# Patient Record
Sex: MALE | Race: WHITE | HISPANIC OR LATINO | Employment: FULL TIME | URBAN - METROPOLITAN AREA
[De-identification: names, ages, dates, MRNs, and addresses within clinical notes are randomized per-mention and may not be internally consistent; named-entity substitution may affect disease eponyms.]

---

## 2017-08-01 ENCOUNTER — APPOINTMENT (OUTPATIENT)
Dept: LAB | Facility: HOSPITAL | Age: 32
End: 2017-08-01
Payer: COMMERCIAL

## 2017-08-01 ENCOUNTER — TRANSCRIBE ORDERS (OUTPATIENT)
Dept: LAB | Facility: HOSPITAL | Age: 32
End: 2017-08-01

## 2017-08-01 DIAGNOSIS — Z00.8 HEALTH EXAMINATION IN POPULATION SURVEY: Primary | ICD-10-CM

## 2017-08-01 DIAGNOSIS — Z00.8 HEALTH EXAMINATION IN POPULATION SURVEY: ICD-10-CM

## 2017-08-01 LAB
CHOLEST SERPL-MCNC: 232 MG/DL (ref 50–200)
EST. AVERAGE GLUCOSE BLD GHB EST-MCNC: 117 MG/DL
HBA1C MFR BLD: 5.7 % (ref 4.2–6.3)
HDLC SERPL-MCNC: 50 MG/DL (ref 40–60)
LDLC SERPL CALC-MCNC: 152 MG/DL (ref 0–100)
TRIGL SERPL-MCNC: 151 MG/DL

## 2017-08-01 PROCEDURE — 80061 LIPID PANEL: CPT

## 2017-08-01 PROCEDURE — 36415 COLL VENOUS BLD VENIPUNCTURE: CPT

## 2017-08-01 PROCEDURE — 83036 HEMOGLOBIN GLYCOSYLATED A1C: CPT

## 2017-11-01 ENCOUNTER — ALLSCRIPTS OFFICE VISIT (OUTPATIENT)
Dept: OTHER | Facility: OTHER | Age: 32
End: 2017-11-01

## 2018-01-09 ENCOUNTER — ALLSCRIPTS OFFICE VISIT (OUTPATIENT)
Dept: OTHER | Facility: OTHER | Age: 33
End: 2018-01-09

## 2018-01-09 DIAGNOSIS — E78.5 HYPERLIPIDEMIA: ICD-10-CM

## 2018-01-09 DIAGNOSIS — Z72.51 HIGH RISK HETEROSEXUAL BEHAVIOR: ICD-10-CM

## 2018-01-09 DIAGNOSIS — R73.01 IMPAIRED FASTING GLUCOSE: ICD-10-CM

## 2018-01-10 NOTE — PROGRESS NOTES
Assessment   1  Asthma (493 90)   2  Heart murmur (785 2) (R01 1)   3  Pulmonic stenosis (424 3) (I37 0)   4  Impaired fasting glucose (790 21) (R73 01)   5  Unprotected sex (V69 2) (Z72 51)   6  Herpes simplex infection (054 9) (B00 9)   7  Hyperlipidemia (272 4) (E78 5)    Plan   Hyperlipidemia    · (1) LIPID PANEL, FASTING; Status:Active; Requested MJF:[id number]; Impaired fasting glucose    · (1) COMPREHENSIVE METABOLIC PANEL; Status:Active; Requested ZMX:[id number];    · (1) HEMOGLOBIN A1C; Status:Active; Requested OUM:[id number];   Pulmonic stenosis    · 1 - Ruma STOUT, Allen Morris (Cardiology) Co-Management  *  Status: Active - Retrospective By    Protocol Authorization  Requested for: [id number]  Care Summary provided  : Yes  SocHx: Unprotected sex    · (1) CHLAMYDIA/GC AMPLIFIED DNA, PCR; Source:Urine, Unspecified Source;    Status:Active; Requested EN:[id number];    · (1) CHRONIC HEPATITIS PANEL; Status:Active; Requested HWC:[id number];    · (1) HIV AG/AB COMBO, 4TH GEN; [Do Not Release]; Status:Active; Requested    FBA:[id number];    · (1) RPR; Status:Active; Requested HIN:[id number];    · (LC) N  gonorrhoeae DENNY, Confirm; Status:Active; Requested WAM:[id number]; Discussion/Summary   Discussion Summary:    Asthma- stable with current inhalers advise to continue, he will find out the name of his daily nigrae and call with the name and dose of medications  stenosis, S/P angioplasty- referred to cardiologist Dr Nataliai Finley to establish care  fasting glucose/Hyperlipidemia- he was counseled in regards to diet and exercise and given handout for both will re-check levels at this time  sex- will check for STD at this time  up in 3 months  Medication SE Review and Pt Understands Tx: Possible side effects of new medications were reviewed with the patient/guardian today  The treatment plan was reviewed with the patient/guardian   The patient/guardian understands and agrees with the treatment plan      Chief Complaint   Chief Complaint Free Text Note Form: Patient seen in office today for a new patient exam to establish care - patient has a history of Asthma, Pulmonic Stenosis which patient's mother stated that he will be needing a referral for a cardiologist       History of Present Illness   HPI: Patient is here to establish care- used to see Dr Mirlande Zepeda   has a history Astham and takes Ventolin as needed a daily inhaler which he thinks Symbicort or Advair he does not remember  is not a smoker  has a History of Pulmonary stenosis and had angioplasty of pulmonary as a infant  is working [employer]  wants to get tested for sexually transmitted disease has history of herpes genital and also unprotect sex in the last 3 months  He says that he uses protection when he has a Herpes Outbreak  had blood work done in August 2017 which showed borderline glucose 5 7 and also also elevated cholesterol  he denies any symptoms at this such as polyuria or polydipsia however  Review of Systems   Complete-Male:      Constitutional: No fever or chills, feels well, no tiredness, no recent weight gain or weight loss  Eyes: No complaints of eye pain, no red eyes, no discharge from eyes, no itchy eyes  Cardiovascular: No complaints of slow heart rate, no fast heart rate, no chest pain, no palpitations, no leg claudication, no lower extremity  Respiratory: No complaints of shortness of breath, no wheezing, no cough, no SOB on exertion, no orthopnea or PND  Gastrointestinal: No complaints of abdominal pain, no constipation, no nausea or vomiting, no diarrhea or bloody stools  Musculoskeletal: No complaints of arthralgia, no myalgias, no joint swelling or stiffness, no limb pain or swelling  Integumentary: No complaints of skin rash or skin lesions, no itching, no skin wound, no dry skin        Neurological: No compliants of headache, no confusion, no convulsions, no numbness or tingling, no dizziness or fainting, no limb weakness, no difficulty walking  Endocrine: No complaints of proptosis, no hot flashes, no muscle weakness, no erectile dysfunction, no deepening of the voice, no feelings of weakness  Hematologic/Lymphatic: No complaints of swollen glands, no swollen glands in the neck, does not bleed easily, no easy bruising  ROS Reviewed:    ROS reviewed  Active Problems   1  Asthma (493 90)   2  Closed nondisplaced fracture of fifth metatarsal bone of right foot, initial encounter     (825 25) (S92 354A)   3  Heart disorder (429 9)   4  Heart murmur (785 2) (R01 1)   5  Pulmonic stenosis (424 3) (I37 0)    Past Medical History   1  History of herpes genitalis (V12 09) (Z86 19)  Active Problems And Past Medical History Reviewed: The active problems and past medical history were reviewed and updated today  Surgical History   1  History of Knee Surgery    Family History   Father    1  Family history of hypercholesterolemia (V18 19) (Z83 42)   2  Family history of hypertension (V17 49) (Z82 49)    Social History    · Unprotected sex (V69 2) (Z72 51)    Current Meds    1  Ventolin  (90 Base) MCG/ACT Inhalation Aerosol Solution Recorded    Allergies   1  No Known Drug Allergies    Vitals   Vital Signs    Recorded: 93ECZ5977 09:35AM   Temperature 97 2 F   Heart Rate 85   Systolic 519   Diastolic 82   Height 6 ft 2 in   Weight 270 lb    BMI Calculated 34 67   BSA Calculated 2 47   O2 Saturation 98     Physical Exam        Constitutional      General appearance: No acute distress, well appearing and well nourished  Eyes      Conjunctiva and lids: No swelling, erythema, or discharge  -- EOMI  Pulmonary      Respiratory effort: No increased work of breathing or signs of respiratory distress  Auscultation of lungs: Clear to auscultation, equal breath sounds bilaterally, no wheezes, no rales, no rhonci         Cardiovascular      Auscultation of heart: Normal rate and rhythm, normal S1 and S2, without murmurs  Musculoskeletal      Gait and station: Normal        Skin      Skin and subcutaneous tissue: Normal without rashes or lesions         Psychiatric      Orientation to person, place and time: Normal        Mood and affect: Normal           Signatures    Electronically signed by : Jonas Huerta MD; Jan 9 2018 10:18AM EST                       (Author)

## 2018-01-12 NOTE — MISCELLANEOUS
Provider Comments  Provider Comments:   new pt no show 11/01/2017      Signatures   Electronically signed by : Yanni Goins MD; Nov 2 2017  7:13AM EST                       (Author)

## 2018-01-22 ENCOUNTER — LAB (OUTPATIENT)
Dept: LAB | Facility: HOSPITAL | Age: 33
End: 2018-01-22
Payer: COMMERCIAL

## 2018-01-22 ENCOUNTER — TRANSCRIBE ORDERS (OUTPATIENT)
Dept: LAB | Facility: HOSPITAL | Age: 33
End: 2018-01-22

## 2018-01-22 VITALS
WEIGHT: 270 LBS | HEIGHT: 74 IN | SYSTOLIC BLOOD PRESSURE: 130 MMHG | BODY MASS INDEX: 34.65 KG/M2 | HEART RATE: 85 BPM | OXYGEN SATURATION: 98 % | TEMPERATURE: 97.2 F | DIASTOLIC BLOOD PRESSURE: 82 MMHG

## 2018-01-22 DIAGNOSIS — R73.01 IMPAIRED FASTING GLUCOSE: ICD-10-CM

## 2018-01-22 DIAGNOSIS — Z72.51 HIGH RISK HETEROSEXUAL BEHAVIOR: ICD-10-CM

## 2018-01-22 DIAGNOSIS — E78.5 HYPERLIPIDEMIA: ICD-10-CM

## 2018-01-22 LAB
ALBUMIN SERPL BCP-MCNC: 3.8 G/DL (ref 3.5–5)
ALP SERPL-CCNC: 64 U/L (ref 46–116)
ALT SERPL W P-5'-P-CCNC: 33 U/L (ref 12–78)
ANION GAP SERPL CALCULATED.3IONS-SCNC: 5 MMOL/L (ref 4–13)
AST SERPL W P-5'-P-CCNC: 14 U/L (ref 5–45)
BILIRUB SERPL-MCNC: 0.33 MG/DL (ref 0.2–1)
BUN SERPL-MCNC: 12 MG/DL (ref 5–25)
CALCIUM SERPL-MCNC: 9 MG/DL (ref 8.3–10.1)
CHLORIDE SERPL-SCNC: 108 MMOL/L (ref 100–108)
CHOLEST SERPL-MCNC: 194 MG/DL (ref 50–200)
CO2 SERPL-SCNC: 28 MMOL/L (ref 21–32)
CREAT SERPL-MCNC: 0.95 MG/DL (ref 0.6–1.3)
EST. AVERAGE GLUCOSE BLD GHB EST-MCNC: 114 MG/DL
GFR SERPL CREATININE-BSD FRML MDRD: 105 ML/MIN/1.73SQ M
GLUCOSE P FAST SERPL-MCNC: 84 MG/DL (ref 65–99)
HBA1C MFR BLD: 5.6 % (ref 4.2–6.3)
HBV CORE AB SER QL: NORMAL
HBV CORE IGM SER QL: NORMAL
HBV SURFACE AG SER QL: NORMAL
HCV AB SER QL: NORMAL
HDLC SERPL-MCNC: 51 MG/DL (ref 40–60)
LDLC SERPL CALC-MCNC: 132 MG/DL (ref 0–100)
POTASSIUM SERPL-SCNC: 4.1 MMOL/L (ref 3.5–5.3)
PROT SERPL-MCNC: 7.2 G/DL (ref 6.4–8.2)
RPR SER QL: NORMAL
SODIUM SERPL-SCNC: 141 MMOL/L (ref 136–145)
TRIGL SERPL-MCNC: 55 MG/DL

## 2018-01-22 PROCEDURE — 80061 LIPID PANEL: CPT

## 2018-01-22 PROCEDURE — 36415 COLL VENOUS BLD VENIPUNCTURE: CPT

## 2018-01-22 PROCEDURE — 83036 HEMOGLOBIN GLYCOSYLATED A1C: CPT

## 2018-01-22 PROCEDURE — 86592 SYPHILIS TEST NON-TREP QUAL: CPT

## 2018-01-22 PROCEDURE — 86705 HEP B CORE ANTIBODY IGM: CPT

## 2018-01-22 PROCEDURE — 87389 HIV-1 AG W/HIV-1&-2 AB AG IA: CPT

## 2018-01-22 PROCEDURE — 80053 COMPREHEN METABOLIC PANEL: CPT

## 2018-01-22 PROCEDURE — 87340 HEPATITIS B SURFACE AG IA: CPT

## 2018-01-22 PROCEDURE — 86803 HEPATITIS C AB TEST: CPT

## 2018-01-22 PROCEDURE — 86704 HEP B CORE ANTIBODY TOTAL: CPT

## 2018-01-24 ENCOUNTER — TELEPHONE (OUTPATIENT)
Dept: FAMILY MEDICINE CLINIC | Facility: CLINIC | Age: 33
End: 2018-01-24

## 2018-01-24 LAB — HIV 1+2 AB+HIV1 P24 AG SERPL QL IA: NORMAL

## 2018-01-24 NOTE — TELEPHONE ENCOUNTER
Na on cell home number no ability to leave message----- Message from Yovany Sims MD sent at 1/24/2018  1:39 PM EST -----  Patient HIV test negative advise pt thanks

## 2018-01-25 ENCOUNTER — TELEPHONE (OUTPATIENT)
Dept: FAMILY MEDICINE CLINIC | Facility: CLINIC | Age: 33
End: 2018-01-25

## 2018-01-26 DIAGNOSIS — J45.909 ASTHMA, UNSPECIFIED ASTHMA SEVERITY, UNSPECIFIED WHETHER COMPLICATED, UNSPECIFIED WHETHER PERSISTENT: Primary | ICD-10-CM

## 2018-01-26 NOTE — TELEPHONE ENCOUNTER
Would like meds refilled    Also would like scripts for nebulizer machine along with mask and tubing sent to [facility] in [facility]

## 2018-01-29 ENCOUNTER — TELEPHONE (OUTPATIENT)
Dept: FAMILY MEDICINE CLINIC | Facility: CLINIC | Age: 33
End: 2018-01-29

## 2018-01-29 NOTE — TELEPHONE ENCOUNTER
Pt notified        ----- Message from Candace Jerome MD sent at 1/24/2018  4:34 PM EST -----      ----- Message -----  From: Candace Jerome MD  Sent: 1/24/2018   1:39 PM  To: Porfirio  Clinical    Patient HIV test negative advise pt thanks

## 2018-01-30 ENCOUNTER — TELEPHONE (OUTPATIENT)
Dept: FAMILY MEDICINE CLINIC | Facility: CLINIC | Age: 33
End: 2018-01-30

## 2018-01-30 NOTE — TELEPHONE ENCOUNTER
Patients' mother  called to see if he can get a refill on famciclovir 500 mg?  I don't see anything in his medication list

## 2018-01-31 DIAGNOSIS — B00.9 HERPES: Primary | ICD-10-CM

## 2018-01-31 RX ORDER — FAMCICLOVIR 500 MG/1
500 TABLET, FILM COATED ORAL 3 TIMES DAILY
Qty: 21 TABLET | Refills: 0 | Status: SHIPPED | OUTPATIENT
Start: 2018-01-31 | End: 2021-10-29

## 2018-02-26 ENCOUNTER — HOSPITAL ENCOUNTER (EMERGENCY)
Facility: HOSPITAL | Age: 33
Discharge: HOME | End: 2018-02-26
Attending: EMERGENCY MEDICINE | Admitting: EMERGENCY MEDICINE

## 2018-02-26 ENCOUNTER — APPOINTMENT (EMERGENCY)
Dept: RADIOLOGY | Facility: HOSPITAL | Age: 33
End: 2018-02-26

## 2018-02-26 VITALS
DIASTOLIC BLOOD PRESSURE: 83 MMHG | TEMPERATURE: 98.8 F | RESPIRATION RATE: 18 BRPM | HEART RATE: 95 BPM | OXYGEN SATURATION: 98 % | WEIGHT: 255 LBS | BODY MASS INDEX: 32.73 KG/M2 | HEIGHT: 74 IN | SYSTOLIC BLOOD PRESSURE: 159 MMHG

## 2018-02-26 DIAGNOSIS — S93.602A FOOT SPRAIN, LEFT, INITIAL ENCOUNTER: Primary | ICD-10-CM

## 2018-02-26 PROCEDURE — 99283 EMERGENCY DEPT VISIT LOW MDM: CPT

## 2018-02-26 PROCEDURE — 73630 X-RAY EXAM OF FOOT: CPT

## 2018-02-26 RX ORDER — ACETAMINOPHEN 325 MG/1
650 TABLET ORAL ONCE
Status: COMPLETED | OUTPATIENT
Start: 2018-02-26 | End: 2018-02-26

## 2018-02-26 RX ADMIN — ACETAMINOPHEN 650 MG: 325 TABLET, FILM COATED ORAL at 16:29

## 2018-02-26 NOTE — ED PROVIDER NOTES
History  Chief Complaint   Patient presents with    Ankle Injury     injured left foot this AM, states foot got caught between wall and machine      HPI    Prior to Admission Medications   Prescriptions Last Dose Informant Patient Reported? Taking? Mometasone Furo-Formoterol Fum (DULERA) 200-5 MCG/ACT AERO   No No   Sig: Inhale 1 puff 2 (two) times a day   Nebulizers (NEBULIZER COMPRESSOR) MISC   No No   Sig: by Does not apply route 2 (two) times a day as needed (sob)   Respiratory Therapy Supplies (NEBULIZER/ADULT MASK) KIT   No No   Sig: by Does not apply route 2 (two) times a day as needed (sob)   famciclovir (FAMVIR) 500 mg tablet   No No   Sig: Take 1 tablet (500 mg total) by mouth 3 (three) times a day for 7 days      Facility-Administered Medications: None       Past Medical History:   Diagnosis Date    Asthma     Heart murmur     Pulmonic stenosis        Past Surgical History:   Procedure Laterality Date    ANGIOPLASTY      KNEE SURGERY         History reviewed  No pertinent family history  I have reviewed and agree with the history as documented  Social History   Substance Use Topics    Smoking status: Never Smoker    Smokeless tobacco: Never Used    Alcohol use No        Review of Systems    Physical Exam  ED Triage Vitals [02/26/18 1524]   Temperature Pulse Respirations Blood Pressure SpO2   98 8 °F (37 1 °C) 95 18 159/83 98 %      Temp Source Heart Rate Source Patient Position - Orthostatic VS BP Location FiO2 (%)   Oral Monitor Sitting Left arm --      Pain Score       8           Orthostatic Vital Signs  Vitals:    02/26/18 1524   BP: 159/83   Pulse: 95   Patient Position - Orthostatic VS: Sitting       Physical Exam   Constitutional: He is oriented to person, place, and time  He appears well-developed and well-nourished  No distress  HENT:   Head: Normocephalic and atraumatic  Eyes: Conjunctivae are normal  Pupils are equal, round, and reactive to light     Neck: Normal range of motion  No tracheal deviation present  Cardiovascular: Normal rate, regular rhythm and intact distal pulses  Pulses:       Dorsalis pedis pulses are 2+ on the left side  Pulmonary/Chest: Effort normal  No respiratory distress  Musculoskeletal:        Left foot: There is tenderness and bony tenderness  There is normal range of motion, no swelling, normal capillary refill, no crepitus and no deformity  Feet:    Neurological: He is alert and oriented to person, place, and time  GCS eye subscore is 4  GCS verbal subscore is 5  GCS motor subscore is 6  Skin: Skin is warm and dry  Psychiatric: He has a normal mood and affect  His behavior is normal    Nursing note and vitals reviewed  ED Medications  Medications   acetaminophen (TYLENOL) tablet 650 mg (650 mg Oral Given 2/26/18 1629)       Diagnostic Studies  Results Reviewed     None                 XR foot 3+ views LEFT    (Results Pending)              Procedures  Procedures       Phone Contacts  ED Phone Contact    ED Course  ED Course                                MDM  Number of Diagnoses or Management Options  Foot sprain, left, initial encounter: new and requires workup  Diagnosis management comments: This is a 70-year-old male who presents here today with a left foot injury  At about 0615 this morning he was driving a piece of equipment work, and his toes were sticking out of it as he was turning a corner by a wall  His toes caught the wall and his foot twisted  He took two ibuprofen when he got home from work at about 10 this morning, and was still having significant pain when he woke from a nap, prompting him to come to the ER for evaluation  He endorses some worsening pain with walking, but is able to do so without difficulties  He has not taken or done anything else for the pain  He has broken several toes before, but is unsure of which foot  He denies any other injuries from this event      ROS: Otherwise negative, unless stated as above  He is well-appearing, in no acute distress  He does have some tenderness to the forefoot, without obvious signs of trauma  Is neurovascularly intact distally  We will get an x-ray to evaluate for underlying bony injury  The x-ray was reviewed by myself and the radiologist, and shows no acute bony  I discussed with the patient findings, treatment at home, follow-up, and indications for return, and he expresses understanding with this plan  Amount and/or Complexity of Data Reviewed  Tests in the radiology section of CPT®: reviewed and ordered  Independent visualization of images, tracings, or specimens: yes      CritCare Time    Disposition  Final diagnoses: Foot sprain, left, initial encounter     Time reflects when diagnosis was documented in both MDM as applicable and the Disposition within this note     Time User Action Codes Description Comment    2/26/2018  4:33 PM Mary Romero Add [[id number]] Foot sprain, left, initial encounter       ED Disposition     ED Disposition Condition Comment    Discharge  Dayana Albert discharge to home/self care  Condition at discharge: Good        Follow-up Information     Follow up With Specialties Details Why [address] Chen MD Family Medicine Schedule an appointment as soon as possible for a visit in 1 week As needed to follow up on your foot [address] [facility]  [address]  [place] [address]  [phone]          Patient's Medications   Discharge Prescriptions    No medications on file     No discharge procedures on file      ED Provider  Electronically Signed by           Jose Arceo MD  02/26/18 1759

## 2018-02-26 NOTE — DISCHARGE INSTRUCTIONS
Wear the Ace wrap, and firm soled shoes when you are walking around  Keep the foot elevated, and apply ice  Take ibuprofen (Motrin, Advil) or acetaminophen (Tylenol) as needed for pain, as per the instructions  Follow up with the primary care doctor to make sure you are doing better  Foot Sprain   WHAT YOU NEED TO KNOW:   A foot sprain is caused by a stretched or torn ligament in the foot or toe  Ligaments are tough tissues that connect bones  DISCHARGE INSTRUCTIONS:   Seek care immediately if:   · You have numbness or tingling below the injury, such as in your toes  · The skin on your injured foot is blue or pale  · You have increased pain, even after you take pain medicine  Contact your healthcare provider if:   · You have new weakness in your foot  · You have new or increased swelling in your foot  · You have new or increased stiffness when you move your injured foot  · You have questions or concerns about your condition or care  Medicines:   · NSAIDs , such as ibuprofen, help decrease swelling, pain, and fever  This medicine is available with or without a doctor's order  NSAIDs can cause stomach bleeding or kidney problems in certain people  If you take blood thinner medicine, always ask if NSAIDs are safe for you  Always read the medicine label and follow directions  Do not give these medicines to children under 10months of age without direction from your child's healthcare provider  · Take your medicine as directed  Contact your healthcare provider if you think your medicine is not helping or if you have side effects  Tell him of her if you are allergic to any medicine  Keep a list of the medicines, vitamins, and herbs you take  Include the amounts, and when and why you take them  Bring the list or the pill bottles to follow-up visits  Carry your medicine list with you in case of an emergency  Self-care:   · Rest your foot    Limit movement in your sprained foot for the first 2 to 3 days  You might need crutches to take weight off your injured foot as it heals  Use crutches as directed  · Apply ice  on your foot for 15 to 20 minutes every hour or as directed  Use an ice pack, or put crushed ice in a plastic bag  Cover it with a towel  Ice helps prevent tissue damage and decreases swelling and pain  · Compress your foot  You may need to use tape or an elastic bandage to support your foot if you have a mild sprain  You may need a splint on your foot for support if your sprain is severe  Wear your splint for as many days as directed  · Elevate your foot  above the level of your heart as often as you can  This will help decrease swelling and pain  Prop your foot on pillows or blankets to keep it elevated comfortably  Exercise your foot:  You may be given exercises to improve your strength and to help decrease stiffness  The exercises and physical therapy can help restore strength and increase the range of motion in your foot  Ask your healthcare provider when you can return to your normal activities or play sports  Prevent another foot sprain:   · Warm up and stretch before you exercise  · Do not exercise when you feel pain or are tired  · Wear equipment to protect yourself when you play sports  Follow up with your healthcare provider as directed:  Write down your questions so you remember to ask them during your visits  © 2017 [employer] Information is for End User's use only and may not be sold, redistributed or otherwise used for commercial purposes  All illustrations and images included in CareNotes® are the copyrighted property of [employer]  or [facility]  The above information is an  only  It is not intended as medical advice for individual conditions or treatments  Talk to your doctor, nurse or pharmacist before following any medical regimen to see if it is safe and effective for you

## 2018-02-27 DIAGNOSIS — J45.909 ASTHMA, UNSPECIFIED ASTHMA SEVERITY, UNSPECIFIED WHETHER COMPLICATED, UNSPECIFIED WHETHER PERSISTENT: Primary | ICD-10-CM

## 2018-04-05 ENCOUNTER — OFFICE VISIT (OUTPATIENT)
Dept: DERMATOLOGY | Facility: CLINIC | Age: 33
End: 2018-04-05
Payer: COMMERCIAL

## 2018-04-05 DIAGNOSIS — L20.84 INTRINSIC ATOPIC DERMATITIS: Primary | ICD-10-CM

## 2018-04-05 DIAGNOSIS — Z13.89 SCREENING FOR SKIN CONDITION: ICD-10-CM

## 2018-04-05 DIAGNOSIS — L72.9 FOLLICULAR CYST OF SKIN: ICD-10-CM

## 2018-04-05 PROCEDURE — 99203 OFFICE O/P NEW LOW 30 MIN: CPT | Performed by: DERMATOLOGY

## 2018-04-05 RX ORDER — BETAMETHASONE DIPROPIONATE 0.5 MG/G
OINTMENT TOPICAL 2 TIMES DAILY
Qty: 15 G | Refills: 1 | Status: SHIPPED | OUTPATIENT
Start: 2018-04-05 | End: 2018-12-06 | Stop reason: SDUPTHER

## 2018-04-05 RX ORDER — DOXYCYCLINE HYCLATE 100 MG/1
100 CAPSULE ORAL EVERY 12 HOURS SCHEDULED
Qty: 28 CAPSULE | Refills: 0 | Status: SHIPPED | OUTPATIENT
Start: 2018-04-05 | End: 2018-04-19

## 2018-04-05 NOTE — PATIENT INSTRUCTIONS
hand dermatitis will probably atopic related will go ahead and treat with topical steroids to see what happens hopefully get this under control need to keep his hands out of water as much as possible where use moisturizes and use mild soap like Dove   follicular cyst appears inflamed we use warm compresses and antibiotics and will see if this resolves if not we will need to consider excision will recheck in a few weeks  Screening for Dermatologic Disorders: Nothing else of concern noted on complete exam follow up prn

## 2018-04-05 NOTE — PROGRESS NOTES
[address] [facility] [address] [facility] DERMATOLOGY  [address] [place] [address]     MRN: [id number] : 1985  Encounter: [id number]  Patient Information: Cleophus Plume  Chief complaint:Inflamed cyst in right axilla and rash on the left hand    History of present illness:  79-year-old male with previous history of atopy and history of eczema presents for concerns regarding itchy rash on his left hand for about 6 months  Patient is time and tried some topical creams use triamcinolone and over-the-counter preparations without improvement patient also notes a sudden onset of inflamed cyst in the right axilla was not previously present as far as he was aware  Past Medical History:   Diagnosis Date    Asthma     Heart murmur     Pulmonic stenosis      Past Surgical History:   Procedure Laterality Date    ANGIOPLASTY      KNEE SURGERY       Social History   History   Alcohol Use No     History   Drug Use No     History   Smoking Status    Never Smoker   Smokeless Tobacco    Never Used     No family history on file  Meds/Allergies   No Known Allergies    Meds:  Prior to Admission medications    Medication Sig Start Date End Date Taking?  Authorizing Provider   Mometasone Furo-Formoterol Fum (DULERA) 200-5 MCG/ACT AERO Inhale 1 puff 2 (two) times a day 18  Yes Mona Mosqueda MD   Nebulizers (NEBULIZER COMPRESSOR) MISC by Does not apply route 2 (two) times a day as needed (sob) 18  Yes Mona Mosqueda MD   Respiratory Therapy Supplies (NEBULIZER/ADULT MASK) KIT by Does not apply route 2 (two) times a day as needed (sob) 18  Yes Mona Mosqueda MD   VENTOLIN  (38 Base) MCG/ACT inhaler inhale 2 puffs by mouth four times a day 18  Yes Mona Mosqueda MD   famciclovir RIVER [facility]) 500 mg tablet Take 1 tablet (500 mg total) by mouth 3 (three) times a day for 7 days 18  Mona Mosqueda MD       Subjective:     Review of Systems:    General: negative for - chills, fatigue, fever,  weight gain or weight loss  Psychological: negative for - anxiety, behavioral disorder, concentration difficulties, decreased libido, depression, irritability, memory difficulties, mood swings, sleep disturbances or suicidal ideation  ENT: negative for - hearing difficulties , nasal congestion, nasal discharge, oral lesions, sinus pain, sneezing, sore throat  Allergy and Immunology: negative for - hives, insect bite sensitivity,  Hematological and Lymphatic: negative for - bleeding problems, blood clots,bruising, swollen lymph nodes  Endocrine: negative for - hair pattern changes, hot flashes, malaise/lethargy, mood swings, palpitations, polydipsia/polyuria, skin changes, temperature intolerance or unexpected weight change  Respiratory: negative for - cough, hemoptysis, orthopnea, shortness of breath, or wheezing  Cardiovascular: negative for - chest pain, dyspnea on exertion, edema,  Gastrointestinal: negative for - abdominal pain, nausea/vomiting  Genito-Urinary: negative for - dysuria, incontinence, irregular/heavy menses or urinary frequency/urgency  Musculoskeletal: negative for - gait disturbance, joint pain, joint stiffness, joint swelling, muscle pain, muscular weakness  Dermatological:  As in HPI  Neurological: negative for confusion, dizziness, headaches, impaired coordination/balance, memory loss, numbness/tingling, seizures, speech problems, tremors or weakness       Objective: There were no vitals taken for this visit      Physical Exam:    General Appearance:    Alert, cooperative, no distress   Head:    Normocephalic, without obvious abnormality, atraumatic           Skin:   A full skin exam was performed including scalp, head scalp, eyes, ears, nose, lips, neck, chest, axilla, abdomen, back, buttocks, bilateral upper extremities, bilateral lower extremities, hands, feet, fingers, toes, fingernails, and toenails   vesicles scaling noted on the left palm no other evidence of eczema anywhere else cystic inflamed a nodule noted in the right axilla     Assessment:     1  Intrinsic atopic dermatitis     2  Follicular cyst of skin     3  Screening for skin condition           Plan:    hand dermatitis will probably atopic related will go ahead and treat with topical steroids to see what happens hopefully get this under control need to keep his hands out of water as much as possible where use moisturizes and use mild soap like Dove   follicular cyst appears inflamed we use warm compresses and antibiotics and will see if this resolves if not we will need to consider excision will recheck in a few weeks  Screening for Dermatologic Disorders: Nothing else of concern noted on complete exam follow up in 1 year     Phill Gardiner MD  4/5/2018,11:36 AM    Portions of the record may have been created with voice recognition software   Occasional wrong word or "sound a like" substitutions may have occurred due to the inherent limitations of voice recognition software   Read the chart carefully and recognize, using context, where substitutions have occurred

## 2018-04-26 ENCOUNTER — OFFICE VISIT (OUTPATIENT)
Dept: DERMATOLOGY | Facility: CLINIC | Age: 33
End: 2018-04-26
Payer: COMMERCIAL

## 2018-04-26 DIAGNOSIS — L72.9 FOLLICULAR CYST OF SKIN: Primary | ICD-10-CM

## 2018-04-26 PROCEDURE — 99212 OFFICE O/P EST SF 10 MIN: CPT | Performed by: DERMATOLOGY

## 2018-04-26 RX ORDER — NAPROXEN 250 MG/1
TABLET ORAL
COMMUNITY
End: 2018-12-21 | Stop reason: ALTCHOICE

## 2018-04-26 NOTE — PATIENT INSTRUCTIONS
Follicular cyst advised patient that this is from hair follicles that ballooned out and forms this type of growth  No treatment indicated unless patient so desires or if lesion enlarges or changes   30 min surgery will be required

## 2018-04-26 NOTE — PROGRESS NOTES
[address] [facility]  [address] [address]     MRN: [id number] : 1985  Encounter: [id number]  Patient Information: Chris Godoy    Subjective:     76-year-old male presents for follow-up for his inflamed cyst right axilla patient notes improvement     Objective: There were no vitals taken for this visit  Physical Exam:    General Appearance:    Alert, cooperative, no distress   Skin:    Cyst still noted on the right axilla without inflammation much smaller than previous     Assessment:     1  Follicular cyst of skin           Plan: Follicular cyst advised patient that this is from hair follicles that ballooned out and forms this type of growth  No treatment indicated unless patient so desires or if lesion enlarges or changes  30 min surgery will be required      Prior to Admission medications    Medication Sig Start Date End Date Taking?  Authorizing Provider   betamethasone, augmented, (DIPROLENE) 0 05 % ointment Apply topically 2 (two) times a day Applied to left hand twice a day  Till  rash clears 18  Yes Steffany Parker MD   fluticasone-salmeterol (ADVAIR DISKUS) 250-50 mcg/dose inhaler Inhale   Yes Historical Provider, MD   Mometasone Furo-Formoterol Fum (DULERA) 200-5 MCG/ACT AERO Inhale 1 puff 2 (two) times a day 18  Yes Kip Avalos MD   Nebulizers (NEBULIZER COMPRESSOR) MISC by Does not apply route 2 (two) times a day as needed (sob) 18  Yes Kip Avalos MD   Respiratory Therapy Supplies (NEBULIZER/ADULT MASK) KIT by Does not apply route 2 (two) times a day as needed (sob) 18  Yes Kip Avalos MD   VENTOLIN  (49 Base) MCG/ACT inhaler inhale 2 puffs by mouth four times a day 18  Yes Kip Avalos MD   famciclovir RIVER [facility]) 500 mg tablet Take 1 tablet (500 mg total) by mouth 3 (three) times a day for 7 days 18  Kip Avalos MD   naproxen (NAPROSYN) 250 mg tablet Take by mouth Historical Provider, MD     No Known Allergies    Luisa Gongora MD  4/26/2018,10:36 AM    Portions of the record may have been created with voice recognition software   Occasional wrong word or "sound a like" substitutions may have occurred due to the inherent limitations of voice recognition software   Read the chart carefully and recognize, using context, where substitutions have occurred

## 2018-08-17 ENCOUNTER — TRANSCRIBE ORDERS (OUTPATIENT)
Dept: LAB | Facility: HOSPITAL | Age: 33
End: 2018-08-17

## 2018-08-17 ENCOUNTER — APPOINTMENT (OUTPATIENT)
Dept: LAB | Facility: HOSPITAL | Age: 33
End: 2018-08-17

## 2018-08-17 DIAGNOSIS — Z00.8 HEALTH EXAMINATION IN POPULATION SURVEY: ICD-10-CM

## 2018-08-17 DIAGNOSIS — Z00.8 HEALTH EXAMINATION IN POPULATION SURVEY: Primary | ICD-10-CM

## 2018-08-17 LAB
CHOLEST SERPL-MCNC: 227 MG/DL (ref 50–200)
EST. AVERAGE GLUCOSE BLD GHB EST-MCNC: 108 MG/DL
HBA1C MFR BLD: 5.4 % (ref 4.2–6.3)
HDLC SERPL-MCNC: 47 MG/DL (ref 40–60)
LDLC SERPL CALC-MCNC: 162 MG/DL (ref 0–100)
NONHDLC SERPL-MCNC: 180 MG/DL
TRIGL SERPL-MCNC: 91 MG/DL

## 2018-08-17 PROCEDURE — 36415 COLL VENOUS BLD VENIPUNCTURE: CPT

## 2018-08-17 PROCEDURE — 80061 LIPID PANEL: CPT

## 2018-08-17 PROCEDURE — 83036 HEMOGLOBIN GLYCOSYLATED A1C: CPT

## 2018-12-06 DIAGNOSIS — L20.84 INTRINSIC ATOPIC DERMATITIS: ICD-10-CM

## 2018-12-06 RX ORDER — BETAMETHASONE DIPROPIONATE 0.5 MG/G
OINTMENT TOPICAL 2 TIMES DAILY
Qty: 15 G | Refills: 0 | Status: SHIPPED | OUTPATIENT
Start: 2018-12-06 | End: 2019-08-28 | Stop reason: ALTCHOICE

## 2018-12-21 ENCOUNTER — OFFICE VISIT (OUTPATIENT)
Dept: FAMILY MEDICINE CLINIC | Facility: CLINIC | Age: 33
End: 2018-12-21
Payer: COMMERCIAL

## 2018-12-21 VITALS
OXYGEN SATURATION: 97 % | BODY MASS INDEX: 34.27 KG/M2 | SYSTOLIC BLOOD PRESSURE: 138 MMHG | RESPIRATION RATE: 18 BRPM | WEIGHT: 267 LBS | DIASTOLIC BLOOD PRESSURE: 86 MMHG | HEIGHT: 74 IN | HEART RATE: 90 BPM | TEMPERATURE: 98.9 F

## 2018-12-21 DIAGNOSIS — L72.9 INFECTED CYST OF SKIN: Primary | ICD-10-CM

## 2018-12-21 DIAGNOSIS — L08.9 INFECTED CYST OF SKIN: Primary | ICD-10-CM

## 2018-12-21 PROCEDURE — 3008F BODY MASS INDEX DOCD: CPT | Performed by: FAMILY MEDICINE

## 2018-12-21 PROCEDURE — 99213 OFFICE O/P EST LOW 20 MIN: CPT | Performed by: FAMILY MEDICINE

## 2018-12-21 PROCEDURE — 1036F TOBACCO NON-USER: CPT | Performed by: FAMILY MEDICINE

## 2018-12-21 RX ORDER — NAPROXEN 500 MG/1
500 TABLET ORAL 2 TIMES DAILY WITH MEALS
Qty: 30 TABLET | Refills: 0 | Status: SHIPPED | OUTPATIENT
Start: 2018-12-21 | End: 2019-08-28 | Stop reason: ALTCHOICE

## 2018-12-21 RX ORDER — SULFAMETHOXAZOLE AND TRIMETHOPRIM 800; 160 MG/1; MG/1
1 TABLET ORAL EVERY 12 HOURS SCHEDULED
Qty: 14 TABLET | Refills: 0 | Status: SHIPPED | OUTPATIENT
Start: 2018-12-21 | End: 2018-12-28

## 2018-12-21 NOTE — PROGRESS NOTES
Rhona Gastelum 1985 male MRN: [id number]    Acute Visit        ASSESSMENT/PLAN  Diagnoses and all orders for this visit:    Infected cyst of skin  -     sulfamethoxazole-trimethoprim (BACTRIM DS) 800-160 mg per tablet; Take 1 tablet by mouth every 12 (twelve) hours for 7 days  -     naproxen (NAPROSYN) 500 mg tablet; Take 1 tablet (500 mg total) by mouth 2 (two) times a day with meals for 15 days          Take abx as directed  Use naproxen for pain/swelling  Fu 1 wk if not better  Future Appointments  Date Time Provider Jayem Booker   12/27/2018 3:20 PM MD IZZY Goldsmith  [facility]        SUBJECTIVE  CC: Cyst (pt has c/o a cyst under his armpits )       He has infected cysts under both arms  Had similar problem about a year ago  No current treatment  No fevers  Rhona Gastelum is a 28 y o  male who presented for an acute visit complaining of  Review of Systems   Constitutional: Negative for chills and fever     Skin:        Redness and tenderness, swelling under b/l arms R> L       Historical Information   The patient history was reviewed as follows:  Past Medical History:   Diagnosis Date    Asthma     Heart murmur     Pulmonic stenosis      Past Surgical History:   Procedure Laterality Date    ANGIOPLASTY      KNEE SURGERY       Family History   Problem Relation Age of Onset    Hyperlipidemia Father     Hypertension Father       Social History   History   Alcohol Use No     History   Drug Use No     History   Smoking Status    Never Smoker   Smokeless Tobacco    Never Used       Medications:   Meds/Allergies   Current Outpatient Prescriptions   Medication Sig Dispense Refill    Nebulizers (NEBULIZER COMPRESSOR) MISC by Does not apply route 2 (two) times a day as needed (sob) 1 each 0    Respiratory Therapy Supplies (NEBULIZER/ADULT MASK) KIT by Does not apply route 2 (two) times a day as needed (sob) 8 each 0    VENTOLIN  (90 Base) MCG/ACT inhaler inhale 2 puffs by mouth four times a day 18 g 2    betamethasone, augmented, (DIPROLENE) 0 05 % ointment Apply topically 2 (two) times a day Applied to left hand twice a day  Till  rash clears (Patient not taking: Reported on 12/21/2018 ) 15 g 0    famciclovir (FAMVIR) 500 mg tablet Take 1 tablet (500 mg total) by mouth 3 (three) times a day for 7 days 21 tablet 0    fluticasone-salmeterol (ADVAIR DISKUS) 250-50 mcg/dose inhaler Inhale      Mometasone Furo-Formoterol Fum (DULERA) 200-5 MCG/ACT AERO Inhale 1 puff 2 (two) times a day (Patient not taking: Reported on 12/21/2018 ) 1 Inhaler 0    naproxen (NAPROSYN) 500 mg tablet Take 1 tablet (500 mg total) by mouth 2 (two) times a day with meals for 15 days 30 tablet 0    sulfamethoxazole-trimethoprim (BACTRIM DS) 800-160 mg per tablet Take 1 tablet by mouth every 12 (twelve) hours for 7 days 14 tablet 0     No current facility-administered medications for this visit  No Known Allergies    OBJECTIVE  Vitals:   Vitals:    12/21/18 1453   BP: 138/86   Pulse: 90   Resp: 18   Temp: 98 9 °F (37 2 °C)   TempSrc: Tympanic   SpO2: 97%   Weight: 121 kg (267 lb)   Height: 6' 2" (1 88 m)       Invasive Devices          No matching active lines, drains, or airways          Physical Exam   Constitutional: He appears well-developed and well-nourished  No distress  Skin:        Nursing note and vitals reviewed  Lab:  I have personally reviewed all pertinent results

## 2019-04-04 ENCOUNTER — APPOINTMENT (OUTPATIENT)
Dept: URGENT CARE | Facility: CLINIC | Age: 34
End: 2019-04-04
Payer: OTHER MISCELLANEOUS

## 2019-04-04 PROCEDURE — G0382 LEV 3 HOSP TYPE B ED VISIT: HCPCS

## 2019-04-04 PROCEDURE — 99283 EMERGENCY DEPT VISIT LOW MDM: CPT

## 2019-08-28 ENCOUNTER — OFFICE VISIT (OUTPATIENT)
Dept: FAMILY MEDICINE CLINIC | Facility: CLINIC | Age: 34
End: 2019-08-28
Payer: COMMERCIAL

## 2019-08-28 VITALS
RESPIRATION RATE: 18 BRPM | TEMPERATURE: 98.5 F | HEIGHT: 72 IN | SYSTOLIC BLOOD PRESSURE: 130 MMHG | HEART RATE: 75 BPM | DIASTOLIC BLOOD PRESSURE: 82 MMHG | WEIGHT: 253 LBS | OXYGEN SATURATION: 97 % | BODY MASS INDEX: 34.27 KG/M2

## 2019-08-28 DIAGNOSIS — E78.5 HYPERLIPIDEMIA, UNSPECIFIED HYPERLIPIDEMIA TYPE: ICD-10-CM

## 2019-08-28 DIAGNOSIS — R73.01 IMPAIRED FASTING GLUCOSE: ICD-10-CM

## 2019-08-28 DIAGNOSIS — Z72.51 UNPROTECTED SEX: ICD-10-CM

## 2019-08-28 DIAGNOSIS — I37.0 PULMONARY VALVE STENOSIS, UNSPECIFIED ETIOLOGY: ICD-10-CM

## 2019-08-28 DIAGNOSIS — F51.01 PRIMARY INSOMNIA: Primary | ICD-10-CM

## 2019-08-28 DIAGNOSIS — Z72.51 HIGH RISK HETEROSEXUAL BEHAVIOR: ICD-10-CM

## 2019-08-28 PROCEDURE — 3008F BODY MASS INDEX DOCD: CPT | Performed by: FAMILY MEDICINE

## 2019-08-28 PROCEDURE — 99214 OFFICE O/P EST MOD 30 MIN: CPT | Performed by: FAMILY MEDICINE

## 2019-08-28 PROCEDURE — 1036F TOBACCO NON-USER: CPT | Performed by: FAMILY MEDICINE

## 2019-08-28 RX ORDER — TRAZODONE HYDROCHLORIDE 100 MG/1
100 TABLET ORAL
Qty: 30 TABLET | Refills: 1 | Status: SHIPPED | OUTPATIENT
Start: 2019-08-28 | End: 2021-10-29

## 2019-08-28 NOTE — PROGRESS NOTES
Assessment/Plan:    No problem-specific Assessment & Plan notes found for this encounter  Diagnoses and all orders for this visit:    Primary insomnia  After discussing risks and benefits of medication along with side effects will start trazodone 100 mg at bedtime at this time  We discussed at length healthy sleeping habits as well  -     traZODone (DESYREL) 100 mg tablet; Take 1 tablet (100 mg total) by mouth daily at bedtime for 30 days    Impaired fasting glucose  -     Comprehensive metabolic panel; Future    Hyperlipidemia, unspecified hyperlipidemia type  -     Lipid panel; Future    High risk heterosexual behavior  -     RPR; Future  -     Hepatitis panel, acute; Future  -     HIV 1/2 AG-AB combo; Future  -     Chlamydia/GC amplified DNA by PCR; Future    Unprotected sex  -     RPR; Future  -     Hepatitis panel, acute; Future  -     HIV 1/2 AG-AB combo; Future  -     Chlamydia/GC amplified DNA by PCR; Future    Pulmonary valve stenosis, unspecified etiology  -     Ambulatory referral to Cardiology; Future      follow-up in 1 month    Subjective:      Patient ID: Rishi Reyna is a 28 y o  male  Patient is here because he has not been sleeping for the past several months  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  He sometimes works out after work  He has not tried OTC medications for sleep at this time  Also he has a history of pulmonary stenosis  He denies any symptoms such as shortness of breath, chest pain or palpitations at this time  He was referred to cardiology 1 5 years ago however never went  also he admits to unprotected sex and would like to get tested for STD's  The following portions of the patient's history were reviewed and updated as appropriate:   He  has a past medical history of Asthma, Heart murmur, and Pulmonic stenosis    He   Patient Active Problem List    Diagnosis Date Noted    Primary insomnia 08/28/2019    Impaired fasting glucose 08/28/2019    Hyperlipidemia 08/28/2019    High risk heterosexual behavior 08/28/2019    Unprotected sex 08/28/2019    Pulmonary valve stenosis 08/28/2019     He  has a past surgical history that includes Angioplasty and Knee surgery  His family history includes Hyperlipidemia in his father; Hypertension in his father  He  reports that he has never smoked  He has never used smokeless tobacco  He reports that he does not drink alcohol or use drugs  Current Outpatient Medications   Medication Sig Dispense Refill    fluticasone-salmeterol (ADVAIR DISKUS) 250-50 mcg/dose inhaler Inhale      Nebulizers (NEBULIZER COMPRESSOR) MISC by Does not apply route 2 (two) times a day as needed (sob) 1 each 0    Respiratory Therapy Supplies (NEBULIZER/ADULT MASK) KIT by Does not apply route 2 (two) times a day as needed (sob) 8 each 0    VENTOLIN  (90 Base) MCG/ACT inhaler inhale 2 puffs by mouth four times a day 18 g 2    famciclovir (FAMVIR) 500 mg tablet Take 1 tablet (500 mg total) by mouth 3 (three) times a day for 7 days 21 tablet 0    traZODone (DESYREL) 100 mg tablet Take 1 tablet (100 mg total) by mouth daily at bedtime for 30 days 30 tablet 1     No current facility-administered medications for this visit        Current Outpatient Medications on File Prior to Visit   Medication Sig    fluticasone-salmeterol (ADVAIR DISKUS) 250-50 mcg/dose inhaler Inhale    Nebulizers (NEBULIZER COMPRESSOR) MISC by Does not apply route 2 (two) times a day as needed (sob)    Respiratory Therapy Supplies (NEBULIZER/ADULT MASK) KIT by Does not apply route 2 (two) times a day as needed (sob)    VENTOLIN  (90 Base) MCG/ACT inhaler inhale 2 puffs by mouth four times a day    famciclovir (FAMVIR) 500 mg tablet Take 1 tablet (500 mg total) by mouth 3 (three) times a day for 7 days    [DISCONTINUED] betamethasone, augmented, (DIPROLENE) 0 05 % ointment Apply topically 2 (two) times a day Applied to left hand twice a day  Till  rash clears (Patient not taking: Reported on 12/21/2018 )    [DISCONTINUED] Mometasone Furo-Formoterol Fum (DULERA) 200-5 MCG/ACT AERO Inhale 1 puff 2 (two) times a day (Patient not taking: Reported on 12/21/2018 )    [DISCONTINUED] naproxen (NAPROSYN) 500 mg tablet Take 1 tablet (500 mg total) by mouth 2 (two) times a day with meals for 15 days     No current facility-administered medications on file prior to visit  He has No Known Allergies       Review of Systems   Constitutional: Negative for activity change, appetite change, fatigue and fever  HENT: Negative for congestion and ear discharge  Respiratory: Negative for cough and shortness of breath  Cardiovascular: Negative for chest pain and palpitations  Gastrointestinal: Negative for diarrhea and nausea  Musculoskeletal: Negative for arthralgias and back pain  Skin: Negative for color change and rash  Neurological: Negative for dizziness and headaches  Psychiatric/Behavioral: Positive for sleep disturbance  Negative for agitation and behavioral problems  Objective:      /82   Pulse 75   Temp 98 5 °F (36 9 °C)   Resp 18   Ht 6' (1 829 m)   Wt 115 kg (253 lb)   SpO2 97%   BMI 34 31 kg/m²          Physical Exam   Constitutional: He is oriented to person, place, and time  He appears well-developed and well-nourished  No distress  Eyes: Pupils are equal, round, and reactive to light  No scleral icterus  Cardiovascular: Normal rate, regular rhythm and normal heart sounds  No murmur heard  Pulmonary/Chest: Effort normal and breath sounds normal  No respiratory distress  He has no wheezes  Abdominal: Soft  Bowel sounds are normal  He exhibits no distension  There is no tenderness  Neurological: He is alert and oriented to person, place, and time  Skin: Skin is warm and dry  No rash noted  He is not diaphoretic  Psychiatric: He has a normal mood and affect

## 2019-09-06 ENCOUNTER — APPOINTMENT (OUTPATIENT)
Dept: LAB | Facility: HOSPITAL | Age: 34
End: 2019-09-06
Payer: COMMERCIAL

## 2019-09-06 DIAGNOSIS — R73.01 IMPAIRED FASTING GLUCOSE: ICD-10-CM

## 2019-09-06 DIAGNOSIS — E78.5 HYPERLIPIDEMIA, UNSPECIFIED HYPERLIPIDEMIA TYPE: ICD-10-CM

## 2019-09-06 DIAGNOSIS — Z72.51 UNPROTECTED SEX: ICD-10-CM

## 2019-09-06 DIAGNOSIS — Z72.51 HIGH RISK HETEROSEXUAL BEHAVIOR: ICD-10-CM

## 2019-09-06 LAB
ALBUMIN SERPL BCP-MCNC: 3.8 G/DL (ref 3.5–5)
ALP SERPL-CCNC: 65 U/L (ref 46–116)
ALT SERPL W P-5'-P-CCNC: 33 U/L (ref 12–78)
ANION GAP SERPL CALCULATED.3IONS-SCNC: 3 MMOL/L (ref 4–13)
AST SERPL W P-5'-P-CCNC: 19 U/L (ref 5–45)
BILIRUB SERPL-MCNC: 0.36 MG/DL (ref 0.2–1)
BUN SERPL-MCNC: 17 MG/DL (ref 5–25)
C TRACH DNA SPEC QL NAA+PROBE: NEGATIVE
CALCIUM SERPL-MCNC: 9.1 MG/DL (ref 8.3–10.1)
CHLORIDE SERPL-SCNC: 104 MMOL/L (ref 100–108)
CHOLEST SERPL-MCNC: 217 MG/DL (ref 50–200)
CO2 SERPL-SCNC: 29 MMOL/L (ref 21–32)
CREAT SERPL-MCNC: 1.09 MG/DL (ref 0.6–1.3)
EST. AVERAGE GLUCOSE BLD GHB EST-MCNC: 114 MG/DL
GFR SERPL CREATININE-BSD FRML MDRD: 88 ML/MIN/1.73SQ M
GLUCOSE P FAST SERPL-MCNC: 97 MG/DL (ref 65–99)
HAV IGM SER QL: NORMAL
HBA1C MFR BLD: 5.6 % (ref 4.2–6.3)
HBV CORE IGM SER QL: NORMAL
HBV SURFACE AG SER QL: NORMAL
HCV AB SER QL: NORMAL
HDLC SERPL-MCNC: 42 MG/DL (ref 40–60)
LDLC SERPL CALC-MCNC: 155 MG/DL (ref 0–100)
N GONORRHOEA DNA SPEC QL NAA+PROBE: NEGATIVE
NONHDLC SERPL-MCNC: 175 MG/DL
POTASSIUM SERPL-SCNC: 4.2 MMOL/L (ref 3.5–5.3)
PROT SERPL-MCNC: 7.4 G/DL (ref 6.4–8.2)
RPR SER QL: NORMAL
SODIUM SERPL-SCNC: 136 MMOL/L (ref 136–145)
TRIGL SERPL-MCNC: 101 MG/DL

## 2019-09-06 PROCEDURE — 80053 COMPREHEN METABOLIC PANEL: CPT

## 2019-09-06 PROCEDURE — 87491 CHLMYD TRACH DNA AMP PROBE: CPT

## 2019-09-06 PROCEDURE — 36415 COLL VENOUS BLD VENIPUNCTURE: CPT

## 2019-09-06 PROCEDURE — 80074 ACUTE HEPATITIS PANEL: CPT

## 2019-09-06 PROCEDURE — 87591 N.GONORRHOEAE DNA AMP PROB: CPT

## 2019-09-06 PROCEDURE — 80061 LIPID PANEL: CPT

## 2019-09-06 PROCEDURE — 86592 SYPHILIS TEST NON-TREP QUAL: CPT

## 2019-09-06 PROCEDURE — 87389 HIV-1 AG W/HIV-1&-2 AB AG IA: CPT

## 2019-09-06 PROCEDURE — 83036 HEMOGLOBIN GLYCOSYLATED A1C: CPT

## 2019-09-08 LAB — HIV 1+2 AB+HIV1 P24 AG SERPL QL IA: NORMAL

## 2019-10-24 ENCOUNTER — CONSULT (OUTPATIENT)
Dept: CARDIOLOGY CLINIC | Facility: CLINIC | Age: 34
End: 2019-10-24
Payer: COMMERCIAL

## 2019-10-24 VITALS
DIASTOLIC BLOOD PRESSURE: 82 MMHG | HEART RATE: 71 BPM | SYSTOLIC BLOOD PRESSURE: 140 MMHG | WEIGHT: 253 LBS | RESPIRATION RATE: 18 BRPM | BODY MASS INDEX: 34.27 KG/M2 | OXYGEN SATURATION: 98 % | HEIGHT: 72 IN

## 2019-10-24 DIAGNOSIS — Z76.89 ENCOUNTER TO ESTABLISH CARE: Primary | ICD-10-CM

## 2019-10-24 DIAGNOSIS — E78.2 MIXED HYPERLIPIDEMIA: ICD-10-CM

## 2019-10-24 DIAGNOSIS — I37.0 PULMONARY VALVE STENOSIS, UNSPECIFIED ETIOLOGY: ICD-10-CM

## 2019-10-24 PROCEDURE — 99243 OFF/OP CNSLTJ NEW/EST LOW 30: CPT | Performed by: INTERNAL MEDICINE

## 2019-10-24 PROCEDURE — 93000 ELECTROCARDIOGRAM COMPLETE: CPT | Performed by: INTERNAL MEDICINE

## 2019-11-04 ENCOUNTER — HOSPITAL ENCOUNTER (OUTPATIENT)
Dept: NON INVASIVE DIAGNOSTICS | Facility: CLINIC | Age: 34
Discharge: HOME | End: 2019-11-04
Payer: COMMERCIAL

## 2019-11-04 DIAGNOSIS — I37.0 PULMONARY VALVE STENOSIS, UNSPECIFIED ETIOLOGY: ICD-10-CM

## 2019-11-04 PROCEDURE — 93306 TTE W/DOPPLER COMPLETE: CPT | Performed by: INTERNAL MEDICINE

## 2019-11-04 PROCEDURE — 93306 TTE W/DOPPLER COMPLETE: CPT

## 2020-03-16 ENCOUNTER — TRANSCRIBE ORDERS (OUTPATIENT)
Dept: LAB | Facility: HOSPITAL | Age: 35
End: 2020-03-16

## 2020-03-16 ENCOUNTER — APPOINTMENT (OUTPATIENT)
Dept: LAB | Facility: HOSPITAL | Age: 35
End: 2020-03-16
Attending: INTERNAL MEDICINE

## 2020-03-16 ENCOUNTER — APPOINTMENT (OUTPATIENT)
Dept: LAB | Facility: HOSPITAL | Age: 35
End: 2020-03-16

## 2020-03-16 DIAGNOSIS — Z00.8 HEALTH EXAMINATION IN POPULATION SURVEYS: ICD-10-CM

## 2020-03-16 DIAGNOSIS — E78.2 MIXED HYPERLIPIDEMIA: ICD-10-CM

## 2020-03-16 DIAGNOSIS — Z00.8 HEALTH EXAMINATION IN POPULATION SURVEYS: Primary | ICD-10-CM

## 2020-03-16 LAB
CHOLEST SERPL-MCNC: 187 MG/DL (ref 50–200)
EST. AVERAGE GLUCOSE BLD GHB EST-MCNC: 105 MG/DL
HBA1C MFR BLD: 5.3 %
HDLC SERPL-MCNC: 55 MG/DL
LDLC SERPL CALC-MCNC: 104 MG/DL (ref 0–100)
TRIGL SERPL-MCNC: 140 MG/DL

## 2020-03-16 PROCEDURE — 36415 COLL VENOUS BLD VENIPUNCTURE: CPT

## 2020-03-16 PROCEDURE — 80061 LIPID PANEL: CPT

## 2020-03-16 PROCEDURE — 83036 HEMOGLOBIN GLYCOSYLATED A1C: CPT

## 2020-03-18 ENCOUNTER — TELEPHONE (OUTPATIENT)
Dept: CARDIOLOGY CLINIC | Facility: CLINIC | Age: 35
End: 2020-03-18

## 2020-03-18 NOTE — TELEPHONE ENCOUNTER
Left detailed message for patient   Advised cholesterol has improved considerably from 6 months ago, but his LDL is still mildly elevated  He should continue to maintain a diet low in saturated and trans fat     We can discuss if further during their next appointment    Asked to call back if any questions

## 2020-03-18 NOTE — TELEPHONE ENCOUNTER
----- Message from Viviana Foreman MD sent at 3/18/2020  8:53 AM EDT -----  Please let him know that his cholesterol has improved considerably from 6 months ago, but his LDL is still mildly elevated  He should continue to maintain a diet low in saturated and trans fat  We can discuss if further during their next appointment

## 2020-12-21 ENCOUNTER — TELEPHONE (OUTPATIENT)
Dept: FAMILY MEDICINE CLINIC | Facility: CLINIC | Age: 35
End: 2020-12-21

## 2020-12-21 DIAGNOSIS — R22.30 AXILLARY MASS, UNSPECIFIED LATERALITY: Primary | ICD-10-CM

## 2020-12-21 RX ORDER — IBUPROFEN 800 MG/1
800 TABLET ORAL EVERY 8 HOURS PRN
Qty: 30 TABLET | Refills: 2 | Status: SHIPPED | OUTPATIENT
Start: 2020-12-21 | End: 2021-10-29

## 2020-12-21 RX ORDER — SULFAMETHOXAZOLE AND TRIMETHOPRIM 800; 160 MG/1; MG/1
1 TABLET ORAL EVERY 12 HOURS SCHEDULED
Qty: 14 TABLET | Refills: 0 | Status: SHIPPED | OUTPATIENT
Start: 2020-12-21 | End: 2020-12-28

## 2021-04-05 ENCOUNTER — IMMUNIZATIONS (OUTPATIENT)
Dept: FAMILY MEDICINE CLINIC | Facility: HOSPITAL | Age: 36
End: 2021-04-05

## 2021-04-05 DIAGNOSIS — Z23 ENCOUNTER FOR IMMUNIZATION: Primary | ICD-10-CM

## 2021-04-05 PROCEDURE — 0001A SARS-COV-2 / COVID-19 MRNA VACCINE (PFIZER-BIONTECH) 30 MCG: CPT

## 2021-04-05 PROCEDURE — 91300 SARS-COV-2 / COVID-19 MRNA VACCINE (PFIZER-BIONTECH) 30 MCG: CPT

## 2021-04-27 ENCOUNTER — IMMUNIZATIONS (OUTPATIENT)
Dept: FAMILY MEDICINE CLINIC | Facility: HOSPITAL | Age: 36
End: 2021-04-27

## 2021-04-27 DIAGNOSIS — Z23 ENCOUNTER FOR IMMUNIZATION: Primary | ICD-10-CM

## 2021-04-27 PROCEDURE — 0002A SARS-COV-2 / COVID-19 MRNA VACCINE (PFIZER-BIONTECH) 30 MCG: CPT

## 2021-04-27 PROCEDURE — 91300 SARS-COV-2 / COVID-19 MRNA VACCINE (PFIZER-BIONTECH) 30 MCG: CPT

## 2021-04-29 ENCOUNTER — TELEPHONE (OUTPATIENT)
Dept: FAMILY MEDICINE CLINIC | Facility: CLINIC | Age: 36
End: 2021-04-29

## 2021-04-29 NOTE — TELEPHONE ENCOUNTER
Cyst under arm is getting sore again, mom is wondering if you could send in an antibiotic    She did schedule an appointment for him for next Friday with you to treat it also, but she was wondering if he should have the antibiotic also -

## 2021-04-30 NOTE — TELEPHONE ENCOUNTER
Realized we had not seen the patient for this so I called his mother back and told her we needed to see him

## 2021-07-07 ENCOUNTER — APPOINTMENT (OUTPATIENT)
Dept: RADIOLOGY | Facility: CLINIC | Age: 36
End: 2021-07-07
Payer: COMMERCIAL

## 2021-07-07 ENCOUNTER — OFFICE VISIT (OUTPATIENT)
Dept: OBGYN CLINIC | Facility: CLINIC | Age: 36
End: 2021-07-07
Payer: OTHER MISCELLANEOUS

## 2021-07-07 VITALS
SYSTOLIC BLOOD PRESSURE: 135 MMHG | DIASTOLIC BLOOD PRESSURE: 85 MMHG | HEIGHT: 72 IN | WEIGHT: 256.4 LBS | HEART RATE: 75 BPM | BODY MASS INDEX: 34.73 KG/M2

## 2021-07-07 DIAGNOSIS — M79.675 PAIN OF TOE OF LEFT FOOT: Primary | ICD-10-CM

## 2021-07-07 DIAGNOSIS — M25.572 PAIN, JOINT, ANKLE AND FOOT, LEFT: ICD-10-CM

## 2021-07-07 DIAGNOSIS — S90.112A CONTUSION OF LEFT GREAT TOE WITHOUT DAMAGE TO NAIL, INITIAL ENCOUNTER: ICD-10-CM

## 2021-07-07 PROCEDURE — 99203 OFFICE O/P NEW LOW 30 MIN: CPT | Performed by: ORTHOPAEDIC SURGERY

## 2021-07-07 PROCEDURE — 73630 X-RAY EXAM OF FOOT: CPT

## 2021-07-07 NOTE — PROGRESS NOTES
Patient Name:  Zonia Jones  MRN:  [id number]    Assessment & Plan     1  Pain, joint, ankle and foot, left  -     XR foot 3+ vw left; Future; Expected date: 07/07/2021    2  Pain of toe of left foot    3  Contusion of left great toe without damage to nail, initial encounter          The patient has acute onset left 1st MTP pain without instability after injury at work earlier this morning  He was fitted with a postoperative shoe in the office today  Patient may weightbear as tolerated as postoperative shoe and wean out to a normal sneaker over the next 2-3 days  He is encouraged to ice and elevate his left great toe and take over-the-counter anti-inflammatories as needed for pain  Patient was given a work note to return to work at full duty on Monday  Patient was also given a prescription to work on range of motion, strengthening, proprioception with physical therapy  He should follow up in 4-5 weeks for repeat evaluation if symptoms persist or worsen  Chief Complaint      left toe pain    History of the Present Illness     Zonia Jones is a 38 y o  male with  Left great toe pain that began this morning at 5:30 a m  while at work  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 and while looking over his right shoulder his left great toe hit the wall causing immediate pain  He has been able ambulate since the incident with a mild limp  He denies any discrete swelling  He has not taken any medicine for the pain  Denies any numbness or tingling  No other new complaints  Review of Systems     Review of Systems   Constitutional: Negative for appetite change and unexpected weight change  HENT: Negative for congestion and trouble swallowing  Eyes: Negative for visual disturbance  Respiratory: Negative for cough and shortness of breath  Cardiovascular: Negative for chest pain and palpitations  Gastrointestinal: Negative for nausea and vomiting     Endocrine: Negative for cold intolerance and heat intolerance  Musculoskeletal: Negative for gait problem and myalgias  Skin: Negative for rash  Neurological: Negative for numbness  Physical Exam     /85   Pulse 75   Ht 6' (1 829 m)   Wt 116 kg (256 lb 6 4 oz)   BMI 34 77 kg/m²       Left foot:  No tenderness present over medial  Or lateral malleoli, calcaneus, hindfoot, midfoot, or toes  Focal tenderness present over medial and plantar aspect of 1st MTP joint without discrete swelling or ecchymosis  No pain or instability with varus or valgus stress at the MTP joint  Active dorsiflexion and plantar flexion to approximately 30, respectively, with pain at the medial and plantar aspects of the MTP joint at terminal range of motion  Sensation intact light touch SP/ DP /tibial nerve distribution  Brisk capillary refill all toes distally  Eyes:  Anicteric sclerae  Neck:  Supple  Lungs:  Normal respiratory effort  Cardiovascular:  Capillary refill is less than 2 seconds  Skin:  Intact without erythema  Neurologic:  Sensation grossly intact to light touch  Psychiatric:  Mood and affect are appropriate  Data Review     I have personally reviewed pertinent films in PACS, and my interpretation follows:      X-rays left foot reviewed in the office today display no fracture dislocation  Joint spaces are well maintained      Past Medical History:   Diagnosis Date    Asthma     Heart murmur     Pulmonic stenosis        Past Surgical History:   Procedure Laterality Date    ANGIOPLASTY      KNEE SURGERY         No Known Allergies    Current Outpatient Medications on File Prior to Visit   Medication Sig Dispense Refill    fluticasone-salmeterol (ADVAIR DISKUS) 250-50 mcg/dose inhaler Inhale      ibuprofen (MOTRIN) 800 mg tablet Take 1 tablet (800 mg total) by mouth every 8 (eight) hours as needed for moderate pain 30 tablet 2    Nebulizers (NEBULIZER COMPRESSOR) MISC by Does not apply route 2 (two) times a day as needed (sob) 1 each 0    VENTOLIN  (90 Base) MCG/ACT inhaler inhale 2 puffs by mouth four times a day 18 g 2    famciclovir (FAMVIR) 500 mg tablet Take 1 tablet (500 mg total) by mouth 3 (three) times a day for 7 days (Patient not taking: Reported on 10/24/2019) 21 tablet 0    Respiratory Therapy Supplies (NEBULIZER/ADULT MASK) KIT by Does not apply route 2 (two) times a day as needed (sob) (Patient not taking: Reported on 10/24/2019) 8 each 0    traZODone (DESYREL) 100 mg tablet Take 1 tablet (100 mg total) by mouth daily at bedtime for 30 days (Patient not taking: Reported on 10/24/2019) 30 tablet 1     No current facility-administered medications on file prior to visit         Social History     Tobacco Use    Smoking status: Never Smoker    Smokeless tobacco: Never Used   Vaping Use    Vaping Use: Never used   Substance Use Topics    Alcohol use: No    Drug use: No       Family History   Problem Relation Age of Onset    Hyperlipidemia Father     Hypertension Father              Procedures Performed     Procedures        Kwadwo Alexander DO

## 2021-07-07 NOTE — LETTER
July 7, 2021     Patient: Kaitlyn Rea   YOB: 1985   Date of Visit: 7/7/2021       To Whom it May Concern:    Kaitlyn Rea is under my professional care  He was seen in my office on 7/7/2021  He may return to work on 07/12/2021       If you have any questions or concerns, please don't hesitate to call           Sincerely,          Kylee Estrella DO        CC: Kaitlyn Rea

## 2021-08-12 ENCOUNTER — APPOINTMENT (OUTPATIENT)
Dept: LAB | Facility: HOSPITAL | Age: 36
End: 2021-08-12

## 2021-08-12 DIAGNOSIS — Z00.8 HEALTH EXAMINATION IN POPULATION SURVEYS: ICD-10-CM

## 2021-08-12 LAB
CHOLEST SERPL-MCNC: 236 MG/DL (ref 50–200)
EST. AVERAGE GLUCOSE BLD GHB EST-MCNC: 111 MG/DL
HBA1C MFR BLD: 5.5 %
HDLC SERPL-MCNC: 50 MG/DL
LDLC SERPL CALC-MCNC: 169 MG/DL (ref 0–100)
NONHDLC SERPL-MCNC: 186 MG/DL
TRIGL SERPL-MCNC: 86 MG/DL

## 2021-08-12 PROCEDURE — 83036 HEMOGLOBIN GLYCOSYLATED A1C: CPT

## 2021-08-12 PROCEDURE — 36415 COLL VENOUS BLD VENIPUNCTURE: CPT

## 2021-08-12 PROCEDURE — 80061 LIPID PANEL: CPT

## 2021-10-29 ENCOUNTER — OFFICE VISIT (OUTPATIENT)
Dept: FAMILY MEDICINE CLINIC | Facility: CLINIC | Age: 36
End: 2021-10-29
Payer: COMMERCIAL

## 2021-10-29 VITALS
HEART RATE: 84 BPM | BODY MASS INDEX: 34.54 KG/M2 | OXYGEN SATURATION: 94 % | TEMPERATURE: 98 F | DIASTOLIC BLOOD PRESSURE: 82 MMHG | WEIGHT: 255 LBS | SYSTOLIC BLOOD PRESSURE: 134 MMHG | HEIGHT: 72 IN

## 2021-10-29 DIAGNOSIS — Z23 NEEDS FLU SHOT: ICD-10-CM

## 2021-10-29 DIAGNOSIS — L73.2 HIDRADENITIS SUPPURATIVA OF RIGHT AXILLA: ICD-10-CM

## 2021-10-29 DIAGNOSIS — L02.411 ABSCESS OF AXILLA, RIGHT: Primary | ICD-10-CM

## 2021-10-29 PROCEDURE — 87070 CULTURE OTHR SPECIMN AEROBIC: CPT | Performed by: FAMILY MEDICINE

## 2021-10-29 PROCEDURE — 87205 SMEAR GRAM STAIN: CPT | Performed by: FAMILY MEDICINE

## 2021-10-29 PROCEDURE — 90471 IMMUNIZATION ADMIN: CPT | Performed by: FAMILY MEDICINE

## 2021-10-29 PROCEDURE — 90686 IIV4 VACC NO PRSV 0.5 ML IM: CPT | Performed by: FAMILY MEDICINE

## 2021-10-29 PROCEDURE — 99214 OFFICE O/P EST MOD 30 MIN: CPT | Performed by: FAMILY MEDICINE

## 2021-10-29 RX ORDER — SULFAMETHOXAZOLE AND TRIMETHOPRIM 800; 160 MG/1; MG/1
1 TABLET ORAL EVERY 12 HOURS SCHEDULED
Qty: 14 TABLET | Refills: 0 | Status: SHIPPED | OUTPATIENT
Start: 2021-10-29 | End: 2021-11-05

## 2021-10-29 RX ORDER — IBUPROFEN 800 MG/1
800 TABLET ORAL EVERY 8 HOURS PRN
Qty: 30 TABLET | Refills: 2 | Status: SHIPPED | OUTPATIENT
Start: 2021-10-29

## 2021-11-02 LAB
BACTERIA WND AEROBE CULT: NO GROWTH
GRAM STN SPEC: NORMAL

## 2022-04-25 ENCOUNTER — OFFICE VISIT (OUTPATIENT)
Dept: DERMATOLOGY | Facility: CLINIC | Age: 37
End: 2022-04-25
Payer: COMMERCIAL

## 2022-04-25 VITALS — HEIGHT: 74 IN | BODY MASS INDEX: 33.37 KG/M2 | WEIGHT: 260 LBS

## 2022-04-25 DIAGNOSIS — L73.2 HIDRADENITIS SUPPURATIVA OF RIGHT AXILLA: Primary | ICD-10-CM

## 2022-04-25 PROCEDURE — 99214 OFFICE O/P EST MOD 30 MIN: CPT | Performed by: DERMATOLOGY

## 2022-04-25 RX ORDER — CLINDAMYCIN PHOSPHATE 10 UG/ML
LOTION TOPICAL DAILY
Qty: 60 ML | Refills: 3 | Status: SHIPPED | OUTPATIENT
Start: 2022-04-25

## 2022-04-25 NOTE — PROGRESS NOTES
[address] [facility] [address]  [phone]  [phone]     MRN: [id number] : 1985  Encounter: [id number]  Patient Information: Kiera Quigley  Chief complaint:  Cysts ingrown hairs that he has noticed for the last year    History of present illness:  54-year-old male was seen previously by me with any inflamed cyst in the right axilla that has subsequently developed new his other spots that on the both axillae seem to come and go and has been more inflamed  Past Medical History:   Diagnosis Date    Asthma     Heart murmur     Pulmonic stenosis      Past Surgical History:   Procedure Laterality Date    ANGIOPLASTY      KNEE SURGERY       Social History   Social History     Substance and Sexual Activity   Alcohol Use No     Social History     Substance and Sexual Activity   Drug Use No     Social History     Tobacco Use   Smoking Status Never Smoker   Smokeless Tobacco Never Used     Family History   Problem Relation Age of Onset    Hyperlipidemia Father     Hypertension Father      Meds/Allergies   No Known Allergies    Meds:  Prior to Admission medications    Medication Sig Start Date End Date Taking?  Authorizing Provider   fluticasone-salmeterol (ADVAIR DISKUS) 250-50 mcg/dose inhaler Inhale   Yes Historical Provider, MD   Nebulizers (NEBULIZER COMPRESSOR) MISC by Does not apply route 2 (two) times a day as needed (sob) 18  Yes Tavo Jenkins MD   VENTOLIN  (83 Base) MCG/ACT inhaler inhale 2 puffs by mouth four times a day 18  Yes Tavo Jenkins MD   ibuprofen (MOTRIN) 800 mg tablet Take 1 tablet (800 mg total) by mouth every 8 (eight) hours as needed for moderate pain  Patient not taking: Reported on 2022  10/29/21   Tavo Jenkins MD       Subjective:     Review of Systems:    General: negative for - chills, fatigue, fever,  weight gain or weight loss  Psychological: negative for - anxiety, behavioral disorder, concentration difficulties, decreased libido, depression, irritability, memory difficulties, mood swings, sleep disturbances or suicidal ideation  ENT: negative for - hearing difficulties , nasal congestion, nasal discharge, oral lesions, sinus pain, sneezing, sore throat  Allergy and Immunology: negative for - hives, insect bite sensitivity,  Hematological and Lymphatic: negative for - bleeding problems, blood clots,bruising, swollen lymph nodes  Endocrine: negative for - hair pattern changes, hot flashes, malaise/lethargy, mood swings, palpitations, polydipsia/polyuria, skin changes, temperature intolerance or unexpected weight change  Respiratory: negative for - cough, hemoptysis, orthopnea, shortness of breath, or wheezing  Cardiovascular: negative for - chest pain, dyspnea on exertion, edema,  Gastrointestinal: negative for - abdominal pain, nausea/vomiting  Genito-Urinary: negative for - dysuria, incontinence, irregular/heavy menses or urinary frequency/urgency  Musculoskeletal: negative for - gait disturbance, joint pain, joint stiffness, joint swelling, muscle pain, muscular weakness  Dermatological:  As in HPI  Neurological: negative for confusion, dizziness, headaches, impaired coordination/balance, memory loss, numbness/tingling, seizures, speech problems, tremors or weakness       Objective:   Ht 6' 2" (1 88 m)   Wt 118 kg (260 lb)   BMI 33 38 kg/m²     Physical Exam:    General Appearance:    Alert, cooperative, no distress   Head:    Normocephalic, without obvious abnormality, atraumatic           Skin:   A full skin exam was performed including scalp, head scalp, eyes, ears, nose, lips, neck, chest, axilla, abdomen, back, buttocks, bilateral upper extremities, bilateral lower extremities, hands, feet, fingers, toes, fingernails, and toenails postinflammatory hyperpigmentation some inflamed cyst noted in both axilla minimally at this time no other evidence of disease elsewhere Assessment:     1  Hidradenitis suppurativa of right axilla  Ambulatory referral to Dermatology         Plan:   Hidradenitis we discussed the concept of this process will go ahead at this point just use topical antibiotics to see if it will control it if no improvement is noted patient to let us know    Lisa Cota MD  7/91/1477,8:39 PM    Portions of the record may have been created with voice recognition software   Occasional wrong word or "sound a like" substitutions may have occurred due to the inherent limitations of voice recognition software   Read the chart carefully and recognize, using context, where substitutions have occurred

## 2022-09-12 ENCOUNTER — APPOINTMENT (OUTPATIENT)
Dept: LAB | Facility: CLINIC | Age: 37
End: 2022-09-12

## 2022-09-12 DIAGNOSIS — Z00.8 ENCOUNTER FOR OTHER GENERAL EXAMINATION: ICD-10-CM

## 2022-09-12 LAB
CHOLEST SERPL-MCNC: 224 MG/DL
EST. AVERAGE GLUCOSE BLD GHB EST-MCNC: 114 MG/DL
HBA1C MFR BLD: 5.6 %
HDLC SERPL-MCNC: 44 MG/DL
LDLC SERPL CALC-MCNC: 157 MG/DL (ref 0–100)
NONHDLC SERPL-MCNC: 180 MG/DL
TRIGL SERPL-MCNC: 115 MG/DL

## 2022-09-12 PROCEDURE — 36415 COLL VENOUS BLD VENIPUNCTURE: CPT

## 2022-09-12 PROCEDURE — 83036 HEMOGLOBIN GLYCOSYLATED A1C: CPT

## 2022-09-12 PROCEDURE — 80061 LIPID PANEL: CPT

## 2022-12-13 NOTE — PROGRESS NOTES
Cardiology Office Note    Mick Wells 28 y o  male MRN: [id number]    10/24/19          Assessment/Plan:  1  Congenital pulmonary stenosis s/p balloon valvuloplasty x2- will evaluate with a TTE    2  HLD- ascvd risk 2 3%- Lifestyle modifications were advised  3  History of reported TIA- will attempt to get prior records      1  Encounter to establish care  POCT ECG   2  Pulmonary valve stenosis, unspecified etiology  Ambulatory referral to Cardiology    Echo complete with contrast if indicated   3  Mixed hyperlipidemia  Lipid Panel with Direct LDL reflex       HPI: Mick Wells is a 28y o  year old male with history of congenital pulmonary stenosis who was referred by his PCP Dr Maria Del Rosario Reyes to establish care  He reports having two prior balloon valvuloplasties performed as a child  He is otherwise doing well from a cardiac standpoint and denies chest pain, dyspnea on exertion, palpitations, or presyncope  He has started exercising regularly without limitation  He reports being diagnosed with two TIAs in his late 25s however was not started aspirin or lipid lowering therapy  He denies family history cardiac disease  He is a lifelong nonsmoker             Patient Active Problem List   Diagnosis    Primary insomnia    Impaired fasting glucose    Hyperlipidemia    High risk heterosexual behavior    Unprotected sex   Favio Ramriez Pulmonary valve stenosis       No Known Allergies      Current Outpatient Medications:     fluticasone-salmeterol (ADVAIR DISKUS) 250-50 mcg/dose inhaler, Inhale, Disp: , Rfl:     Nebulizers (NEBULIZER COMPRESSOR) MISC, by Does not apply route 2 (two) times a day as needed (sob), Disp: 1 each, Rfl: 0    VENTOLIN  (90 Base) MCG/ACT inhaler, inhale 2 puffs by mouth four times a day, Disp: 18 g, Rfl: 2    famciclovir (FAMVIR) 500 mg tablet, Take 1 tablet (500 mg total) by mouth 3 (three) times a day for 7 days (Patient not taking: Reported on 10/24/2019), Disp: 21 tablet, Rfl: 0   Respiratory Therapy Supplies (NEBULIZER/ADULT MASK) KIT, by Does not apply route 2 (two) times a day as needed (sob) (Patient not taking: Reported on 10/24/2019), Disp: 8 each, Rfl: 0    traZODone (DESYREL) 100 mg tablet, Take 1 tablet (100 mg total) by mouth daily at bedtime for 30 days (Patient not taking: Reported on 10/24/2019), Disp: 30 tablet, Rfl: 1    Past Medical History:   Diagnosis Date    Asthma     Heart murmur     Pulmonic stenosis        Family History   Problem Relation Age of Onset    Hyperlipidemia Father     Hypertension Father        Past Surgical History:   Procedure Laterality Date    ANGIOPLASTY      KNEE SURGERY         Social History     Socioeconomic History    Marital status: Single     Spouse name: Not on file    Number of children: Not on file    Years of education: Not on file    Highest education level: Not on file   Occupational History    Not on file   Social Needs    Financial resource strain: Not on file    Food insecurity:     Worry: Not on file     Inability: Not on file    Transportation needs:     Medical: Not on file     Non-medical: Not on file   Tobacco Use    Smoking status: Never Smoker    Smokeless tobacco: Never Used   Substance and Sexual Activity    Alcohol use: No    Drug use: No    Sexual activity: Not on file     Comment: Unprotected sex   Lifestyle    Physical activity:     Days per week: Not on file     Minutes per session: Not on file    Stress: Not on file   Relationships    Social connections:     Talks on phone: Not on file     Gets together: Not on file     Attends [employer] service: Not on file     Active member of club or organization: Not on file     Attends meetings of clubs or organizations: Not on file     Relationship status: Not on file    Intimate partner violence:     Fear of current or ex partner: Not on file     Emotionally abused: Not on file     Physically abused: Not on file     Forced sexual activity: Not on file Other Topics Concern    Not on file   Social History Narrative    Not on file       Review of Systems   Constitution: Negative for diaphoresis, weight gain and weight loss  HENT: Negative for congestion  Cardiovascular: Negative for chest pain, dyspnea on exertion, irregular heartbeat, leg swelling, near-syncope, orthopnea, palpitations, paroxysmal nocturnal dyspnea and syncope  Respiratory: Negative for shortness of breath, sleep disturbances due to breathing and snoring  Hematologic/Lymphatic: Does not bruise/bleed easily  Skin: Negative for rash  Musculoskeletal: Negative for myalgias  Gastrointestinal: Negative for nausea and vomiting  Neurological: Negative for excessive daytime sleepiness and light-headedness  Psychiatric/Behavioral: The patient is not nervous/anxious  Vitals: /82   Pulse 71   Resp 18   Ht 6' (1 829 m)   Wt 115 kg (253 lb)   SpO2 98%   BMI 34 31 kg/m²       Physical Exam:     GEN: Alert and oriented x 3, in no acute distress  Well appearing and well nourished  HEENT: Sclera anicteric, conjunctivae pink, mucous membranes moist  Oropharynx clear  NECK: Supple, no carotid bruits, no significant JVD  Trachea midline, no thyromegaly  HEART: Regular rhythm, normal S1 and S2, no murmurs, clicks, gallops or rubs  PMI nondisplaced, no thrills  LUNGS: Clear to auscultation bilaterally; no wheezes, rales, or rhonchi  No increased work of breathing or signs of respiratory distress  ABDOMEN: Soft, nontender, nondistended, normoactive bowel sounds  EXTREMITIES: Skin warm and well perfused, no clubbing, cyanosis, or edema  NEURO: No focal findings  Normal speech  Mood and affect normal    SKIN: Normal without suspicious lesions on exposed skin        Lab Results:       Lab Results   Component Value Date    HGBA1C 5 6 09/06/2019    HGBA1C 5 4 08/17/2018    HGBA1C 5 6 01/22/2018     Lab Results   Component Value Date    CHOL 187 08/04/2015     Lab Results Component Value Date    HDL 42 09/06/2019    HDL 47 08/17/2018    HDL 51 01/22/2018     Lab Results   Component Value Date    LDLCALC 155 (H) 09/06/2019    LDLCALC 162 (H) 08/17/2018    LDLCALC 132 (H) 01/22/2018     Lab Results   Component Value Date    TRIG 101 09/06/2019    TRIG 91 08/17/2018    TRIG 55 01/22/2018     No results found for: CHOLHDL 1 person assist

## 2023-04-25 ENCOUNTER — OFFICE VISIT (OUTPATIENT)
Dept: FAMILY MEDICINE CLINIC | Facility: CLINIC | Age: 38
End: 2023-04-25

## 2023-04-25 VITALS
DIASTOLIC BLOOD PRESSURE: 84 MMHG | SYSTOLIC BLOOD PRESSURE: 126 MMHG | BODY MASS INDEX: 34.8 KG/M2 | HEIGHT: 74 IN | WEIGHT: 271.2 LBS | OXYGEN SATURATION: 94 % | TEMPERATURE: 97.8 F | HEART RATE: 82 BPM

## 2023-04-25 DIAGNOSIS — J45.909 ASTHMA, UNSPECIFIED ASTHMA SEVERITY, UNSPECIFIED WHETHER COMPLICATED, UNSPECIFIED WHETHER PERSISTENT: ICD-10-CM

## 2023-04-25 DIAGNOSIS — Z00.00 HEALTH MAINTENANCE EXAMINATION: Primary | ICD-10-CM

## 2023-04-25 DIAGNOSIS — L73.2 HIDRADENITIS SUPPURATIVA OF RIGHT AXILLA: ICD-10-CM

## 2023-04-25 RX ORDER — ALBUTEROL SULFATE 90 UG/1
2 AEROSOL, METERED RESPIRATORY (INHALATION) 4 TIMES DAILY
Qty: 18 G | Refills: 3 | Status: SHIPPED | OUTPATIENT
Start: 2023-04-25

## 2023-04-25 NOTE — PROGRESS NOTES
Name: Sydnee Corbin      : 1985      MRN: [id number]  Encounter Provider: Toby Pate MD  Encounter Date: 2023   Encounter department: [address] [place] [address]     1  Health maintenance examination  See below otherwise normal exam    2  Hidradenitis suppurativa of right axilla  -     Ambulatory Referral to Dermatology; Future    3  Asthma, unspecified asthma severity, unspecified whether complicated, unspecified whether persistent  -     albuterol (Ventolin HFA) 90 mcg/act inhaler; Inhale 2 puffs 4 (four) times a day    4  BMI 34 0-34 9,adult  -     Semaglutide-Weight Management (WEGOVY) 0 25 MG/0 5ML; Inject 0 5 mL (0 25 mg total) under the skin once a week    Follow up in 3 months       Subjective     Patient is here for a yearly physical   He is interested in losing weight has gained close to 20 lbs since his last appt  Also has recurrent cysts underneath both axilla  Has a Hx of them  Review of Systems   Constitutional: Negative for activity change, appetite change, fatigue and fever  HENT: Negative for congestion and ear discharge  Respiratory: Negative for cough and shortness of breath  Cardiovascular: Negative for chest pain and palpitations  Gastrointestinal: Negative for diarrhea and nausea  Musculoskeletal: Negative for arthralgias and back pain  Skin: Negative for color change and rash  Neurological: Negative for dizziness and headaches  Psychiatric/Behavioral: Negative for agitation and behavioral problems         Past Medical History:   Diagnosis Date    Asthma     Heart murmur     Pulmonic stenosis      Past Surgical History:   Procedure Laterality Date    ANGIOPLASTY      KNEE SURGERY       Family History   Problem Relation Age of Onset    Hyperlipidemia Father     Hypertension Father      Social History     Socioeconomic History    Marital status: Single     Spouse name: None    Number of children: None    Years of "education: None   • Highest education level: None   Occupational History   • None   Tobacco Use   • Smoking status: Never   • Smokeless tobacco: Never   Vaping Use   • Vaping Use: Never used   Substance and Sexual Activity   • Alcohol use: No   • Drug use: No   • Sexual activity: None     Comment: Unprotected sex   Other Topics Concern   • None   Social History Narrative   • None     Social Determinants of Health     Financial Resource Strain: Not on file   Food Insecurity: Not on file   Transportation Needs: Not on file   Physical Activity: Not on file   Stress: Not on file   Social Connections: Not on file   Intimate Partner Violence: Not on file   Housing Stability: Not on file     Current Outpatient Medications on File Prior to Visit   Medication Sig   • fluticasone-salmeterol (Advair) 250-50 mcg/dose inhaler Inhale   • Nebulizers (NEBULIZER COMPRESSOR) MISC by Does not apply route 2 (two) times a day as needed (sob)   • [DISCONTINUED] VENTOLIN  (90 Base) MCG/ACT inhaler inhale 2 puffs by mouth four times a day   • clindamycin (CLEOCIN T) 1 % lotion Apply topically daily To inflamed areas till clear (Patient not taking: Reported on 4/25/2023)   • ibuprofen (MOTRIN) 800 mg tablet Take 1 tablet (800 mg total) by mouth every 8 (eight) hours as needed for moderate pain (Patient not taking: Reported on 4/25/2022 )     No Known Allergies  Immunization History   Administered Date(s) Administered   • COVID-19 PFIZER VACCINE 0 3 ML IM 04/05/2021, 04/27/2021   • INFLUENZA 10/01/2017   • Influenza Quadrivalent Preservative Free 3 years and older IM 10/01/2017   • Influenza, injectable, quadrivalent, preservative free 0 5 mL 10/29/2021       Objective     /84 (BP Location: Left arm, Patient Position: Sitting)   Pulse 82   Temp 97 8 °F (36 6 °C) (Temporal)   Ht 6' 2\" (1 88 m)   Wt 123 kg (271 lb 3 2 oz)   SpO2 94%   BMI 34 82 kg/m²     Physical Exam  Constitutional:       General: He is not in acute " distress  Appearance: He is well-developed  He is not diaphoretic  Eyes:      General: No scleral icterus  Pupils: Pupils are equal, round, and reactive to light  Cardiovascular:      Rate and Rhythm: Normal rate and regular rhythm  Heart sounds: Normal heart sounds  No murmur heard  Pulmonary:      Effort: Pulmonary effort is normal  No respiratory distress  Breath sounds: Normal breath sounds  No wheezing  Abdominal:      General: Bowel sounds are normal  There is no distension  Palpations: Abdomen is soft  Tenderness: There is no abdominal tenderness  Skin:     General: Skin is warm and dry  Findings: No rash  Neurological:      Mental Status: He is alert and oriented to person, place, and time         Jesus Alberto Noonan MD

## 2023-04-25 NOTE — PROGRESS NOTES
BMI Counseling: Body mass index is 34 82 kg/m²  The BMI is above normal  Nutrition recommendations include reducing portion sizes, decreasing overall calorie intake and 3-5 servings of fruits/vegetables daily  Exercise recommendations include exercising 3-5 times per week

## 2023-05-31 DIAGNOSIS — Z72.51 UNPROTECTED SEX: ICD-10-CM

## 2023-05-31 RX ORDER — VALACYCLOVIR HYDROCHLORIDE 1 G/1
1000 TABLET, FILM COATED ORAL 3 TIMES DAILY
Qty: 21 TABLET | Refills: 3 | Status: SHIPPED | OUTPATIENT
Start: 2023-05-31 | End: 2023-06-07

## 2023-06-15 ENCOUNTER — APPOINTMENT (OUTPATIENT)
Dept: LAB | Facility: CLINIC | Age: 38
End: 2023-06-15

## 2023-06-15 DIAGNOSIS — Z00.8 HEALTH EXAMINATION IN POPULATION SURVEY: ICD-10-CM

## 2023-06-15 LAB
CHOLEST SERPL-MCNC: 195 MG/DL
EST. AVERAGE GLUCOSE BLD GHB EST-MCNC: 108 MG/DL
HBA1C MFR BLD: 5.4 %
HDLC SERPL-MCNC: 39 MG/DL
LDLC SERPL CALC-MCNC: 131 MG/DL (ref 0–100)
NONHDLC SERPL-MCNC: 156 MG/DL
TRIGL SERPL-MCNC: 124 MG/DL

## 2023-06-15 PROCEDURE — 83036 HEMOGLOBIN GLYCOSYLATED A1C: CPT

## 2023-06-15 PROCEDURE — 36415 COLL VENOUS BLD VENIPUNCTURE: CPT

## 2023-06-15 PROCEDURE — 80061 LIPID PANEL: CPT

## 2023-06-24 PROBLEM — Z00.00 HEALTH MAINTENANCE EXAMINATION: Status: RESOLVED | Noted: 2023-04-25 | Resolved: 2023-06-24

## 2023-07-17 ENCOUNTER — OFFICE VISIT (OUTPATIENT)
Dept: FAMILY MEDICINE CLINIC | Facility: CLINIC | Age: 38
End: 2023-07-17
Payer: COMMERCIAL

## 2023-07-17 VITALS
DIASTOLIC BLOOD PRESSURE: 84 MMHG | OXYGEN SATURATION: 94 % | BODY MASS INDEX: 35.62 KG/M2 | HEART RATE: 77 BPM | WEIGHT: 263 LBS | TEMPERATURE: 98.2 F | SYSTOLIC BLOOD PRESSURE: 124 MMHG | HEIGHT: 72 IN

## 2023-07-17 PROCEDURE — 99214 OFFICE O/P EST MOD 30 MIN: CPT | Performed by: FAMILY MEDICINE

## 2023-07-17 NOTE — PROGRESS NOTES
Name: Darryl Alexander      : 1985      MRN: [id number]  Encounter Provider: Michelle Alas MD  Encounter Date: 2023   Encounter department: [address] [place] [address]. BMI 34.0-34.9,adult  After discussing risks and benefits of medication along with side effects will start the following:  -     Semaglutide-Weight Management (WEGOVY) 2.4 MG/0.75ML; Inject 0.75 mL (2.4 mg total) under the skin once a week    F/U in 3 months       Subjective     Patient is here to follow up for weight loss. He has lost close to 10 lbs since his last appt, 3 months ago. Takes wegovy no side effects. Review of Systems   Constitutional: Negative for activity change, appetite change, fatigue and fever. HENT: Negative for congestion and ear discharge. Respiratory: Negative for cough and shortness of breath. Cardiovascular: Negative for chest pain and palpitations. Gastrointestinal: Negative for diarrhea and nausea. Musculoskeletal: Negative for arthralgias and back pain. Skin: Negative for color change and rash. Neurological: Negative for dizziness and headaches. Psychiatric/Behavioral: Negative for agitation and behavioral problems.        Past Medical History:   Diagnosis Date   • Asthma    • Heart murmur    • Pulmonic stenosis      Past Surgical History:   Procedure Laterality Date   • ANGIOPLASTY     • KNEE SURGERY       Family History   Problem Relation Age of Onset   • Hyperlipidemia Father    • Hypertension Father      Social History     Socioeconomic History   • Marital status: Single     Spouse name: None   • Number of children: None   • Years of education: None   • Highest education level: None   Occupational History   • None   Tobacco Use   • Smoking status: Never   • Smokeless tobacco: Never   Vaping Use   • Vaping Use: Never used   Substance and Sexual Activity   • Alcohol use: No   • Drug use: No   • Sexual activity: None     Comment: Unprotected sex   Other Topics Concern   • None   Social History Narrative   • None     Social Determinants of Health     Financial Resource Strain: Not on file   Food Insecurity: Not on file   Transportation Needs: Not on file   Physical Activity: Not on file   Stress: Not on file   Social Connections: Not on file   Intimate Partner Violence: Not on file   Housing Stability: Not on file     Current Outpatient Medications on File Prior to Visit   Medication Sig   • albuterol (Ventolin HFA) 90 mcg/act inhaler Inhale 2 puffs 4 (four) times a day   • fluticasone-salmeterol (Advair) 250-50 mcg/dose inhaler Inhale   • [DISCONTINUED] Semaglutide-Weight Management (WEGOVY) 1.7 MG/0.75ML Inject 0.75 mL (1.7 mg total) under the skin once a week   • clindamycin (CLEOCIN T) 1 % lotion Apply topically daily To inflamed areas till clear (Patient not taking: Reported on 4/25/2023)   • ibuprofen (MOTRIN) 800 mg tablet Take 1 tablet (800 mg total) by mouth every 8 (eight) hours as needed for moderate pain (Patient not taking: Reported on 4/25/2022)   • Nebulizers (NEBULIZER COMPRESSOR) MISC by Does not apply route 2 (two) times a day as needed (sob) (Patient not taking: Reported on 7/17/2023)   • valACYclovir (VALTREX) 1,000 mg tablet Take 1 tablet (1,000 mg total) by mouth 3 (three) times a day for 7 days     No Known Allergies  Immunization History   Administered Date(s) Administered   • COVID-19 PFIZER VACCINE 0.3 ML IM 04/05/2021, 04/27/2021   • INFLUENZA 10/01/2017   • Influenza Quadrivalent Preservative Free 3 years and older IM 10/01/2017   • Influenza, injectable, quadrivalent, preservative free 0.5 mL 10/29/2021       Objective     /84   Pulse 77   Temp 98.2 °F (36.8 °C)   Ht 6' (1.829 m)   Wt 119 kg (263 lb)   SpO2 94%   BMI 35.67 kg/m²     Physical Exam  Constitutional:       General: He is not in acute distress. Appearance: He is well-developed. He is not diaphoretic. Eyes:      General: No scleral icterus.      Pupils: Pupils are equal, round, and reactive to light. Cardiovascular:      Rate and Rhythm: Normal rate and regular rhythm. Heart sounds: Normal heart sounds. No murmur heard. Pulmonary:      Effort: Pulmonary effort is normal. No respiratory distress. Breath sounds: Normal breath sounds. No wheezing. Abdominal:      General: Bowel sounds are normal. There is no distension. Palpations: Abdomen is soft. Tenderness: There is no abdominal tenderness. Skin:     General: Skin is warm and dry. Findings: No rash. Neurological:      Mental Status: He is alert and oriented to person, place, and time.        Anna Miller MD

## 2023-07-18 ENCOUNTER — TELEPHONE (OUTPATIENT)
Dept: FAMILY MEDICINE CLINIC | Facility: CLINIC | Age: 38
End: 2023-07-18

## 2023-07-18 DIAGNOSIS — F51.01 PRIMARY INSOMNIA: Primary | ICD-10-CM

## 2023-07-18 RX ORDER — ZOLPIDEM TARTRATE 5 MG/1
5 TABLET ORAL
Qty: 30 TABLET | Refills: 0 | Status: SHIPPED | OUTPATIENT
Start: 2023-07-18

## 2023-07-18 NOTE — TELEPHONE ENCOUNTER
Patient called and said that a long time ago one of his past providers gave him Ambien. He does not know the dose he used to take. He wanted to ask you if you would prescribe this for him?

## 2023-08-21 DIAGNOSIS — F51.01 PRIMARY INSOMNIA: ICD-10-CM

## 2023-08-22 ENCOUNTER — OFFICE VISIT (OUTPATIENT)
Dept: FAMILY MEDICINE CLINIC | Facility: CLINIC | Age: 38
End: 2023-08-22
Payer: COMMERCIAL

## 2023-08-22 VITALS
SYSTOLIC BLOOD PRESSURE: 122 MMHG | WEIGHT: 257 LBS | BODY MASS INDEX: 34.81 KG/M2 | TEMPERATURE: 97.8 F | DIASTOLIC BLOOD PRESSURE: 74 MMHG | HEIGHT: 72 IN | OXYGEN SATURATION: 94 % | HEART RATE: 78 BPM

## 2023-08-22 DIAGNOSIS — R42 VERTIGO: Primary | ICD-10-CM

## 2023-08-22 PROBLEM — L02.411 ABSCESS OF AXILLA, RIGHT: Status: RESOLVED | Noted: 2021-10-29 | Resolved: 2023-08-22

## 2023-08-22 PROBLEM — L73.2 HIDRADENITIS SUPPURATIVA OF RIGHT AXILLA: Status: RESOLVED | Noted: 2021-10-29 | Resolved: 2023-08-22

## 2023-08-22 PROCEDURE — 99214 OFFICE O/P EST MOD 30 MIN: CPT | Performed by: FAMILY MEDICINE

## 2023-08-22 RX ORDER — MECLIZINE HYDROCHLORIDE 25 MG/1
25 TABLET ORAL 3 TIMES DAILY PRN
Qty: 30 TABLET | Refills: 0 | Status: SHIPPED | OUTPATIENT
Start: 2023-08-22

## 2023-08-22 RX ORDER — ZOLPIDEM TARTRATE 5 MG/1
5 TABLET ORAL
Qty: 30 TABLET | Refills: 0 | Status: SHIPPED | OUTPATIENT
Start: 2023-08-22

## 2023-08-22 NOTE — PROGRESS NOTES
Ted Davis 1985 male MRN: [id number]      ASSESSMENT/PLAN  Problem List Items Addressed This Visit    None  Visit Diagnoses     Vertigo    -  Primary    Relevant Medications    meclizine (ANTIVERT) 25 mg tablet    Other Relevant Orders    Ambulatory Referral to Physical Therapy        History/exam suggestive of vertigo. Otherwise benign neuro exam, does have middle ear effusions B/L. Will start Meclizine PRN for symptom relief -- reviewed possible ADRs including somnolence, not to take prior to work/driving. Will also refer to PT for vestibular therapy. Provided work note. To call if symptoms persist/worsen. Future Appointments   Date Time Provider [address]   9/5/2023  4:00 PM Clinton Francois MD [facility]          SUBJECTIVE  CC: Earache and Dizziness      HPI:  Ted Davis is a 44 y.o. male who presents due to acute illness as detailed below. R ear pain yesterday -- felt a pop in his ear when yawning   Then developed lightheaded/dizziness, nausea/vomiting   No recent URI symptoms   Of note, on Wegovy -- last dose 8/20, has been on current dosage, so unsure if related to this     Review of Systems   HENT: Positive for ear pain. Negative for congestion, rhinorrhea and sore throat. Respiratory: Negative for cough. Gastrointestinal: Positive for nausea and vomiting. Neurological: Positive for dizziness and light-headedness.        Historical Information   The patient history was reviewed and updated as follows:    Past Medical History:   Diagnosis Date   • Abscess of axilla, right 10/29/2021   • Asthma    • Heart murmur    • Hidradenitis suppurativa of right axilla 10/29/2021   • Pulmonic stenosis      Past Surgical History:   Procedure Laterality Date   • ANGIOPLASTY     • KNEE SURGERY       Family History   Problem Relation Age of Onset   • Hyperlipidemia Father    • Hypertension Father       Social History   Social History     Substance and Sexual Activity   Alcohol Use No     Social History     Substance and Sexual Activity   Drug Use No     Social History     Tobacco Use   Smoking Status Never   Smokeless Tobacco Never       Medications:     Current Outpatient Medications:   •  albuterol (Ventolin HFA) 90 mcg/act inhaler, Inhale 2 puffs 4 (four) times a day, Disp: 18 g, Rfl: 3  •  fluticasone-salmeterol (Advair) 250-50 mcg/dose inhaler, Inhale, Disp: , Rfl:   •  meclizine (ANTIVERT) 25 mg tablet, Take 1 tablet (25 mg total) by mouth 3 (three) times a day as needed for dizziness, Disp: 30 tablet, Rfl: 0  •  Semaglutide-Weight Management (WEGOVY) 2.4 MG/0.75ML, Inject 0.75 mL (2.4 mg total) under the skin once a week, Disp: 3 mL, Rfl: 3  •  zolpidem (AMBIEN) 5 mg tablet, Take 1 tablet (5 mg total) by mouth daily at bedtime as needed for sleep, Disp: 30 tablet, Rfl: 0  •  valACYclovir (VALTREX) 1,000 mg tablet, Take 1 tablet (1,000 mg total) by mouth 3 (three) times a day for 7 days, Disp: 21 tablet, Rfl: 3  No Known Allergies    OBJECTIVE    Vitals:   Vitals:    08/22/23 1001   BP: 122/74   BP Location: Left arm   Patient Position: Sitting   Pulse: 78   Temp: 97.8 °F (36.6 °C)   TempSrc: Temporal   SpO2: 94%   Weight: 117 kg (257 lb)   Height: 6' (1.829 m)           Physical Exam  Vitals and nursing note reviewed. Constitutional:       General: He is not in acute distress. Appearance: Normal appearance. Comments: Laying down looking straight up at ceiling upon entrance to room   HENT:      Head: Normocephalic and atraumatic. Right Ear: Ear canal and external ear normal. A middle ear effusion is present. Tympanic membrane is bulging. Tympanic membrane is not perforated or erythematous. Left Ear: Ear canal and external ear normal. A middle ear effusion is present. Tympanic membrane is bulging. Tympanic membrane is not perforated or erythematous. Cardiovascular:      Rate and Rhythm: Normal rate and regular rhythm.    Pulmonary:      Effort: Pulmonary effort is normal. No respiratory distress. Breath sounds: Normal breath sounds. Neurological:      Mental Status: He is alert. Cranial Nerves: Cranial nerves 2-12 are intact. Sensory: Sensation is intact. Motor: Motor function is intact. No weakness or tremor. Coordination: Coordination is intact. Finger-Nose-Finger Test and Heel to RUST Test normal. Rapid alternating movements normal.      Gait: Gait is intact.    Psychiatric:         Mood and Affect: Mood normal.                    DO Gustavo E[facility]olsBo[facility] [address] [facility]   8/22/2023  10:27 AM

## 2023-08-22 NOTE — LETTER
August 22, 2023     Patient: Milagros Carlos  YOB: 1985  Date of Visit: 8/22/2023      To Whom it May Concern:    Milagros Carlos is under my professional care. Radha Hernandez was seen in my office on 8/22/2023. Radha Hernandez may return to work on 08/24/2023 . If you have any questions or concerns, please don't hesitate to call.          Sincerely,          Max Springer DO        CC: No Recipients

## 2023-08-24 ENCOUNTER — EVALUATION (OUTPATIENT)
Dept: PHYSICAL THERAPY | Facility: CLINIC | Age: 38
End: 2023-08-24
Payer: COMMERCIAL

## 2023-08-24 DIAGNOSIS — R42 VERTIGO: Primary | ICD-10-CM

## 2023-08-24 PROCEDURE — 97161 PT EVAL LOW COMPLEX 20 MIN: CPT

## 2023-08-24 PROCEDURE — 97112 NEUROMUSCULAR REEDUCATION: CPT

## 2023-08-24 NOTE — PROGRESS NOTES
PT Evaluation     Today's date: 2023  Patient name: Alo Sharif  : 1985  MRN: [id number]  Referring provider: Kevin Kraus DO  Dx: No diagnosis found. Assessment  Assessment details: Alo Sharif is a 44 y.o. male presenting to physical therapy for an initial evaluation with a MD referral of ***. The patient demonstrates decreased ***. These deficits have limited the patient's ability to complete ADLs, vocational responsibilities, and recreational activities ***. This patient would benefit from OP PT services to address their impairments and functional limitations to maximize functional mobility***. The patient was provided a HEP and demonstrated/verbalized understanding. Impairments: abnormal or restricted ROM, activity intolerance, impaired balance, impaired physical strength, lacks appropriate home exercise program, pain with function, weight-bearing intolerance and poor posture     Symptom irritability: moderateUnderstanding of Dx/Px/POC: excellent   Prognosis: good    Goals  STG to be achieved in 4 weeks: The patient will report a decrease in *** "at worst" subjective pain rating score to at least */10 to allow for improved tolerance for weightbearing activities. The patient will increase *** AROM to at least *** degrees to allow for improved functional mobility. The patient will increase *** MMT score to at least */5 to allow for improved functional mobility. LTG to be achieved by DC: The patient will be independent in comprehensive HEP. The patient will report a decrease in *** "at worst" subjective pain rating score to at least */10 to allow for improved tolerance  for functional mobility. The patient will report no pain with usual activities. The patient will improve *** AROM to [facility] to allow for improved functional mobility. The patient will increase *** MMT score to [facility] to allow for improved functional mobility.    Functional Goal      Plan  Patient would benefit from: skilled physical therapy  Planned modality interventions: thermotherapy: hydrocollator packs, TENS and cryotherapy  Planned therapy interventions: manual therapy, joint mobilization, balance/weight bearing training, neuromuscular re-education, patient education, flexibility, strengthening, stretching, therapeutic activities, therapeutic exercise, gait training, home exercise program and abdominal trunk stabilization  Frequency: 2x week  Duration in weeks: 12  Treatment plan discussed with: patient        Subjective Evaluation    History of Present Illness  Mechanism of injury: Manjula Coto presents to therapy with a chief complaint of     Saw the PCP on 8/22/23 and was referred to OP PT and prescribed meclizine to utilize prn for symptoms.               Objective      Vestibular/Ocular-Motor Screening (VOMS):      Headache Dizziness Nausea Fogginess Comments   Baseline symptoms {0-10:5044}/10 {0-10:5044}/10 {0-10:5044}/10 {0-10:5044}/10    Smooth pursuits {0-10:5044}/10 {0-10:5044}/10 {0-10:5044}/10 {0-10:5044}/10    Saccades - horizontal {0-10:5044}/10 {0-10:5044}/10 {0-10:5044}/10 {0-10:5044}/10    Saccades - vertical {0-10:5044}/10 {0-10:5044}/10 {0-10:5044}/10 {0-10:5044}/10    Convergence (near point) {0-10:5044}/10 {0-10:5044}/10 {0-10:5044}/10 {0-10:5044}/10         Trial 1 *** cm        Trial 1 *** cm        Trial 1 *** cm   VOR- horizontal {0-10:5044}/10 {0-10:5044}/10 {0-10:5044}/10 {0-10:5044}/10    VOR- vertical {0-10:5044}/10 {0-10:5044}/10 {0-10:5044}/10 {0-10:5044}/10    Visual Motion Sensitivity Test {0-10:5044}/10 {0-10:5044}/10 {0-10:5044}/10 {0-10:5044}/10      Comments:     Precautions: ***      Manuals                                                                 Neuro Re-Ed                                                                                                        Ther Ex Ther Activity                                       Gait Training                                       Modalities

## 2023-08-24 NOTE — PROGRESS NOTES
PT Evaluation     Today's date: 2023  Patient name: Taras Jon  : 1985  MRN: [id number]  Referring provider: Matthew Elizabeth DO  Dx:   Encounter Diagnosis     ICD-10-CM    1. Vertigo  R42 Ambulatory Referral to Physical Therapy                     Assessment  Assessment details: Taras Jon is a 44 y.o. male presenting to physical therapy for an initial evaluation with a MD referral of vertigo. Upon onset of symptoms, the patient reported "room spinning" dizziness sensation with rolling to the left and right, laying down or sitting up from bed, as well as turning his head left or right while driving limiting the patient's ability to complete ADLs, vocational responsibilities, and recreational activities. He states that he did not have any of these symptoms when getting up this morning or during this day so far, however. He tested negative with the THE [facility] for bilateral posterior canals x3 as well as negative with loaded THE [facility], with increased cervical extension, and with increased speed of completion. He was also negative for the supine roll test for horizontal canals bilaterally x3. The patient was educated on his negative testing this session as well as anatomy/pathophysiology of the vestibular system and BPPV. He was advised that he will be placed on hold for 30 days and to contact the office should his symptoms return. The patient would benefit from OP PT services to address their impairments and functional limitations to maximize functional mobility. Impairments: impaired balance and lacks appropriate home exercise program  Other impairment: Dizziness, difficulty driving, sitting up or laying down in bed, rolling over in bed    Symptom irritability: moderateUnderstanding of Dx/Px/POC: good   Prognosis: good    Goals  STG to be achieved in 4 weeks:  1. The patient will report 50% reduction of dizziness to allow improved tolerance to completing functional activities.   2. The patient will be able to drive without limitations. LTG to be achieved by DC:   1. The patient will report resolution of dizziness symptoms to allow for return to PLOF and completion of functional activities. 2. The patient will achieve expected FOTO score demonstrating improved ability to complete functional activities. Plan  Patient would benefit from: skilled physical therapy  Planned therapy interventions: balance/weight bearing training, manual therapy, neuromuscular re-education, patient education, canalith repositioning, postural training, therapeutic exercise, therapeutic activities, gait training and home exercise program  Frequency: 2-3x per week. Duration in weeks: 6  Plan of Care beginning date: 8/24/2023  Plan of Care expiration date: 10/5/2023  Treatment plan discussed with: patient        Subjective Evaluation    History of Present Illness  Mechanism of injury: Giuseppe Coe presents to therapy with a chief complaint of "room spinning" dizziness which started began on 8/22/23. He reports that the day before, he felt a "popping" sensation in his right ear when yawning and later he felt as if he had water in his ears. He saw the PCP on 8/22/23 as he thought he was starting to have an ear infection, however that morning he had the onset of the dizziness. PCP referred him to OP PT and prescribed meclizine to utilize prn for symptoms. He reports that the dizziness occurs when rolling over in bed, getting out of bed, and rotating his head to either direction. He also feels slightly off balance when walking because of this. He reports feeling nauseous during these episodes as well with one episode of vomiting. He reports difficulty driving because when he turns his head, he becomes dizzy. He states that this morning was the first morning that he didn't have the symptoms.  He denies mental fog, difficulty reading, watching tv, focusing his vision on an object, fatigue, dysarthria, dysphagia, diplopia, ataxia. Patient Goals  Patient goal: to not be dizzy anymore   Pain  No pain reported  Exacerbated by: rolling over, laying down or sitting up, turning head     Social Support  Lives with: parents    Employment status: working ([employer]: [place])  Treatments  Previous treatment: medication        Objective     Ambulation     Observational Gait   Gait: within functional limits     Additional Observational Gait Details  No instabilities or LOB noted  Neuro Exam:     Positional testing   Edgardo-Hallpike   Left posterior canal: WNL  Right posterior canal: WNL  Du Quoin-Hallpike comment: (-) x3 left and right, also negative with loaded Sai Nikki for both directions  Roll test   Left horizontal canal: WNL  Right horizontal canal: WNL             Precautions: N/A      POC expires Auth Status Unit limit Start date  Expiration date PT/OT + Visit Limit?  [address] [place] [address]   10/5    Re-eval by 9/24 N/A N/A 8/24 12/31 BOMN $15              Visit/Unit Tracking  AUTH Status:  Date 8/24              BOMN Used 1               Remaining                    Manuals 8/24            Du Quoin Hallpike B/L (-) x3    (-) loaded            Supine Roll B/L (-) x3                                      Neuro Re-Ed             Pt education anatomy, pathophysiology vestibular system and BPPV                                                                                           Ther Ex                                                                                                                     Ther Activity                                       Gait Training                                       Modalities

## 2023-09-20 DIAGNOSIS — F51.01 PRIMARY INSOMNIA: ICD-10-CM

## 2023-09-20 RX ORDER — ZOLPIDEM TARTRATE 5 MG/1
5 TABLET ORAL
Qty: 30 TABLET | Refills: 1 | Status: SHIPPED | OUTPATIENT
Start: 2023-09-20

## 2023-11-24 DIAGNOSIS — F51.01 PRIMARY INSOMNIA: ICD-10-CM

## 2023-11-26 RX ORDER — ZOLPIDEM TARTRATE 5 MG/1
5 TABLET ORAL
Qty: 30 TABLET | Refills: 0 | Status: SHIPPED | OUTPATIENT
Start: 2023-11-26

## 2023-11-30 ENCOUNTER — TELEPHONE (OUTPATIENT)
Dept: FAMILY MEDICINE CLINIC | Facility: CLINIC | Age: 38
End: 2023-11-30

## 2023-11-30 NOTE — TELEPHONE ENCOUNTER
Patient called and stated that last year when he got the flu shot he had a reaction - arm got swollen and painful and mom stated that his throat got itchy. Not sure if he should get it again this year - please advise.  . [employer] employee

## 2023-12-22 DIAGNOSIS — F51.01 PRIMARY INSOMNIA: ICD-10-CM

## 2023-12-22 RX ORDER — ZOLPIDEM TARTRATE 5 MG/1
5 TABLET ORAL
Qty: 30 TABLET | Refills: 2 | Status: SHIPPED | OUTPATIENT
Start: 2023-12-22

## 2024-01-02 RX ORDER — SEMAGLUTIDE 2.4 MG/.75ML
INJECTION, SOLUTION SUBCUTANEOUS
Qty: 3 ML | Refills: 3 | Status: SHIPPED | OUTPATIENT
Start: 2024-01-02

## 2024-03-04 DIAGNOSIS — F51.01 PRIMARY INSOMNIA: ICD-10-CM

## 2024-03-04 RX ORDER — ZOLPIDEM TARTRATE 5 MG/1
5 TABLET ORAL
Qty: 30 TABLET | Refills: 2 | Status: SHIPPED | OUTPATIENT
Start: 2024-03-04

## 2024-03-28 ENCOUNTER — OFFICE VISIT (OUTPATIENT)
Dept: FAMILY MEDICINE CLINIC | Facility: CLINIC | Age: 39
End: 2024-03-28
Payer: COMMERCIAL

## 2024-03-28 VITALS
SYSTOLIC BLOOD PRESSURE: 124 MMHG | WEIGHT: 240 LBS | HEART RATE: 78 BPM | TEMPERATURE: 98.1 F | BODY MASS INDEX: 32.51 KG/M2 | HEIGHT: 72 IN | DIASTOLIC BLOOD PRESSURE: 72 MMHG | OXYGEN SATURATION: 96 %

## 2024-03-28 DIAGNOSIS — H10.13 ALLERGIC CONJUNCTIVITIS OF BOTH EYES: Primary | ICD-10-CM

## 2024-03-28 PROCEDURE — 99213 OFFICE O/P EST LOW 20 MIN: CPT | Performed by: PHYSICIAN ASSISTANT

## 2024-03-28 RX ORDER — OLOPATADINE HYDROCHLORIDE 1 MG/ML
1 SOLUTION/ DROPS OPHTHALMIC 2 TIMES DAILY
Qty: 5 ML | Refills: 1 | Status: SHIPPED | OUTPATIENT
Start: 2024-03-28

## 2024-03-28 NOTE — PROGRESS NOTES
"Name: Marlon Salinas      : 1985      MRN: [id number]  Encounter Provider: Arnie Schroeder PA-C  Encounter Date: 3/28/2024   Encounter department: [facility]    Assessment & Plan     1. Allergic conjunctivitis of both eyes  -     olopatadine (PATANOL) 0.1 % ophthalmic solution; Administer 1 drop to both eyes 2 (two) times a day    Begin olopatadine ggts. If no improvement consider oral antihistamine as well. Discussed return precautions and signs/sx of bacterial conjunctivitis. Follow up prn       Subjective     Pt presents with itchy, watery, \"gritty\" eyes for the past week. Wakes with crusting. +tearing. No pain. No vision change. No FB known in eyes. Hx of allergies and allergic conjunctivitis. Denies other allergy sx at this time.       Review of Systems   Constitutional:  Negative for chills, fatigue and fever.   HENT:  Negative for congestion, ear pain, hearing loss, nosebleeds, postnasal drip, rhinorrhea, sinus pressure, sinus pain, sneezing and sore throat.    Eyes:  Positive for itching. Negative for pain, discharge and visual disturbance.   Respiratory:  Negative for cough, chest tightness, shortness of breath and wheezing.    Cardiovascular:  Negative for chest pain, palpitations and leg swelling.   Gastrointestinal:  Negative for abdominal pain, blood in stool, constipation, diarrhea, nausea and vomiting.   Neurological:  Negative for dizziness, light-headedness and numbness.       Past Medical History:   Diagnosis Date   • Abscess of axilla, right 10/29/2021   • Asthma    • Heart murmur    • Hidradenitis suppurativa of right axilla 10/29/2021   • Pulmonic stenosis      Past Surgical History:   Procedure Laterality Date   • ANGIOPLASTY     • KNEE SURGERY       Family History   Problem Relation Age of Onset   • Hyperlipidemia Father    • Hypertension Father      Social History     Socioeconomic History   • Marital status: Single     Spouse name: None   • Number of children: " None   • Years of education: None   • Highest education level: None   Occupational History   • None   Tobacco Use   • Smoking status: Never   • Smokeless tobacco: Never   Vaping Use   • Vaping status: Never Used   Substance and Sexual Activity   • Alcohol use: No   • Drug use: No   • Sexual activity: None     Comment: Unprotected sex   Other Topics Concern   • None   Social History Narrative   • None     Social Determinants of Health     Financial Resource Strain: Not on file   Food Insecurity: Not on file   Transportation Needs: Not on file   Physical Activity: Not on file   Stress: Not on file   Social Connections: Not on file   Intimate Partner Violence: Not on file   Housing Stability: Not on file     Current Outpatient Medications on File Prior to Visit   Medication Sig   • albuterol (Ventolin HFA) 90 mcg/act inhaler Inhale 2 puffs 4 (four) times a day   • fluticasone-salmeterol (Advair) 250-50 mcg/dose inhaler Inhale   • meclizine (ANTIVERT) 25 mg tablet Take 1 tablet (25 mg total) by mouth 3 (three) times a day as needed for dizziness   • valACYclovir (VALTREX) 1,000 mg tablet Take 1 tablet (1,000 mg total) by mouth 3 (three) times a day for 7 days   • Wegovy 2.4 MG/0.75ML inject 2.4 milligrams subcutaneously weekly   • zolpidem (AMBIEN) 5 mg tablet Take 1 tablet (5 mg total) by mouth daily at bedtime as needed for sleep     No Known Allergies  Immunization History   Administered Date(s) Administered   • COVID-19 PFIZER VACCINE 0.3 ML IM 04/05/2021, 04/27/2021   • INFLUENZA 10/01/2017   • Influenza Quadrivalent Preservative Free 3 years and older IM 10/01/2017   • Influenza, injectable, quadrivalent, preservative free 0.5 mL 10/29/2021       Objective     /72   Pulse 78   Temp 98.1 °F (36.7 °C)   Ht 6' (1.829 m)   Wt 109 kg (240 lb)   SpO2 96%   BMI 32.55 kg/m²     Physical Exam  Vitals and nursing note reviewed.   Constitutional:       General: He is not in acute distress.     Appearance: Normal  appearance. He is not ill-appearing.   HENT:      Head: Normocephalic and atraumatic.      Nose: Nose normal.   Eyes:      Extraocular Movements: Extraocular movements intact.      Conjunctiva/sclera: Conjunctivae normal.      Pupils: Pupils are equal, round, and reactive to light.      Comments: Tearing noted, no FB, no evidence of bacterial conjunctivitis, suspect allergic conjunctivitis   Pulmonary:      Effort: Pulmonary effort is normal. No respiratory distress.   Musculoskeletal:      Cervical back: Normal range of motion.   Neurological:      Mental Status: He is alert and oriented to person, place, and time.       Arnie Schroeder PA-C

## 2024-05-13 ENCOUNTER — TELEPHONE (OUTPATIENT)
Dept: FAMILY MEDICINE CLINIC | Facility: CLINIC | Age: 39
End: 2024-05-13

## 2024-05-13 NOTE — TELEPHONE ENCOUNTER
I started prior auth for Wegovy 2.4 mg.   Banner Transposition Flap Text: The defect edges were debeveled with a #15 scalpel blade. Given the location of the defect and the proximity to free margins a Banner transposition flap was deemed most appropriate. Using a sterile surgical marker, an appropriate flap was drawn around the defect. The area thus outlined was incised deep to adipose tissue with a #15 scalpel blade. The skin margins were undermined to an appropriate distance in all directions utilizing iris scissors. Following this, the designed flap was carried into the primary defect and sutured into place.

## 2024-05-14 RX ORDER — SEMAGLUTIDE 2.4 MG/.75ML
2.4 INJECTION, SOLUTION SUBCUTANEOUS WEEKLY
Qty: 3 ML | Refills: 3 | Status: SHIPPED | OUTPATIENT
Start: 2024-05-14

## 2024-05-14 NOTE — TELEPHONE ENCOUNTER
Pt contacted Call Center requested refill of their medication. Patients mother is calling in wanting to get a medication refill on Wegovy 2.4 mg as this no longer has any refills and the pharmacy also advised that this needs authorization as well.

## 2024-05-21 ENCOUNTER — TELEPHONE (OUTPATIENT)
Age: 39
End: 2024-05-21

## 2024-05-21 NOTE — TELEPHONE ENCOUNTER
Pharmacy called to verify prior auth on pt's wegovy.      Notified pharmacy that PA has been started.  They will f/u again in a few days.

## 2024-05-22 DIAGNOSIS — F51.01 PRIMARY INSOMNIA: ICD-10-CM

## 2024-05-22 RX ORDER — ZOLPIDEM TARTRATE 5 MG/1
5 TABLET ORAL
Qty: 30 TABLET | Refills: 2 | Status: SHIPPED | OUTPATIENT
Start: 2024-05-22

## 2024-05-22 NOTE — TELEPHONE ENCOUNTER
Medication:     zolpidem (AMBIEN) 5 mg tablet       Dose/Frequency: Take 1 tablet (5 mg total) by mouth daily at bedtime as needed for sleep     Quantity:  30 tablet     Pharmacy: [id number] - [place] [facility] [address]    Office:   [x] PCP/Provider - George Singh MD   [] Speciality/Provider -     Does the patient have enough for 3 days?   [x] Yes   [] No - Send as HP to POD

## 2024-06-12 ENCOUNTER — TELEPHONE (OUTPATIENT)
Age: 39
End: 2024-06-12

## 2024-06-12 NOTE — TELEPHONE ENCOUNTER
Patient called asking for a script of antibiotics sore throat 3 days    Script would go to [facility] Phone [phone]    Please let patient know

## 2024-08-07 ENCOUNTER — APPOINTMENT (OUTPATIENT)
Dept: LAB | Facility: CLINIC | Age: 39
End: 2024-08-07

## 2024-08-07 DIAGNOSIS — Z00.8 HEALTH EXAMINATION IN POPULATION SURVEY: ICD-10-CM

## 2024-08-07 LAB
CHOLEST SERPL-MCNC: 186 MG/DL
EST. AVERAGE GLUCOSE BLD GHB EST-MCNC: 100 MG/DL
HBA1C MFR BLD: 5.1 %
HDLC SERPL-MCNC: 44 MG/DL
LDLC SERPL CALC-MCNC: 126 MG/DL (ref 0–100)
NONHDLC SERPL-MCNC: 142 MG/DL
TRIGL SERPL-MCNC: 79 MG/DL

## 2024-08-07 PROCEDURE — 36415 COLL VENOUS BLD VENIPUNCTURE: CPT

## 2024-08-07 PROCEDURE — 80061 LIPID PANEL: CPT

## 2024-08-07 PROCEDURE — 83036 HEMOGLOBIN GLYCOSYLATED A1C: CPT

## 2024-10-16 ENCOUNTER — OFFICE VISIT (OUTPATIENT)
Dept: FAMILY MEDICINE CLINIC | Facility: CLINIC | Age: 39
End: 2024-10-16
Payer: COMMERCIAL

## 2024-10-16 VITALS
WEIGHT: 239.5 LBS | HEIGHT: 72 IN | TEMPERATURE: 98.5 F | DIASTOLIC BLOOD PRESSURE: 75 MMHG | SYSTOLIC BLOOD PRESSURE: 120 MMHG | OXYGEN SATURATION: 98 % | BODY MASS INDEX: 32.44 KG/M2 | HEART RATE: 66 BPM

## 2024-10-16 DIAGNOSIS — F51.01 PRIMARY INSOMNIA: ICD-10-CM

## 2024-10-16 DIAGNOSIS — J45.909 ASTHMA, UNSPECIFIED ASTHMA SEVERITY, UNSPECIFIED WHETHER COMPLICATED, UNSPECIFIED WHETHER PERSISTENT: ICD-10-CM

## 2024-10-16 PROCEDURE — 99214 OFFICE O/P EST MOD 30 MIN: CPT | Performed by: FAMILY MEDICINE

## 2024-10-16 RX ORDER — ALBUTEROL SULFATE 90 UG/1
2 INHALANT RESPIRATORY (INHALATION) 4 TIMES DAILY
Qty: 18 G | Refills: 3 | Status: SHIPPED | OUTPATIENT
Start: 2024-10-16

## 2024-10-16 RX ORDER — ZOLPIDEM TARTRATE 10 MG/1
10 TABLET ORAL
Qty: 30 TABLET | Refills: 2 | Status: SHIPPED | OUTPATIENT
Start: 2024-10-16

## 2024-10-16 RX ORDER — SEMAGLUTIDE 1.7 MG/.75ML
INJECTION, SOLUTION SUBCUTANEOUS
Qty: 3 ML | Refills: 3 | Status: SHIPPED | OUTPATIENT
Start: 2024-10-16

## 2024-10-16 RX ORDER — FLUTICASONE PROPIONATE AND SALMETEROL 250; 50 UG/1; UG/1
1 POWDER RESPIRATORY (INHALATION) 2 TIMES DAILY
Qty: 60 BLISTER | Refills: 3 | Status: SHIPPED | OUTPATIENT
Start: 2024-10-16

## 2024-10-16 NOTE — ASSESSMENT & PLAN NOTE
After discussing risks and benefits of medication along with side effects will start the following:   Orders:    zolpidem (AMBIEN) 10 mg tablet; Take 1 tablet (10 mg total) by mouth daily at bedtime as needed for sleep

## 2024-10-16 NOTE — LETTER
Date: 10/16/2024      To whom it may concern:    Mr. Marlon Salinas is my patient at [facility]. He is allergic to the flu and covid vaccine (hives). He should be exempt from Flu and Covid vaccines given history of allergic reaction to these vaccines.  If you have any questions feel free to contact us at [phone].      Sincerely,          George Singh MD

## 2024-10-16 NOTE — ASSESSMENT & PLAN NOTE
Orders:    Fluticasone-Salmeterol (Advair Diskus) 250-50 mcg/dose inhaler; Inhale 1 puff 2 (two) times a day    albuterol (Ventolin HFA) 90 mcg/act inhaler; Inhale 2 puffs 4 (four) times a day    F/U in 6 months or as needed

## 2024-10-16 NOTE — PROGRESS NOTES
Ambulatory Visit  Name: Marlon Salinas      : 1985      MRN: [id number]  Encounter Provider: George Singh MD  Encounter Date: 10/16/2024   Encounter department: [facility]    Assessment & Plan  Primary insomnia  After discussing risks and benefits of medication along with side effects will start the following:   Orders:    zolpidem (AMBIEN) 10 mg tablet; Take 1 tablet (10 mg total) by mouth daily at bedtime as needed for sleep    BMI 34.0-34.9,adult  After discussing risks and benefits of medication along with side effects will start the following:   Orders:    Semaglutide-Weight Management (Wegovy) 1.7 MG/0.75ML; Inject 1.7 mg under the skin weekly    Asthma, unspecified asthma severity, unspecified whether complicated, unspecified whether persistent    Orders:    Fluticasone-Salmeterol (Advair Diskus) 250-50 mcg/dose inhaler; Inhale 1 puff 2 (two) times a day    albuterol (Ventolin HFA) 90 mcg/act inhaler; Inhale 2 puffs 4 (four) times a day    F/U in 6 months or as needed     History of Present Illness     Patient is here to follow up for insomnia. He has been taking ambien 5 mg which has not been keeping him asleep.  Also he is currently on wegovy 2 mg. Denies any side effects. Has lost 30 lbs since last year will like to start weaning off at this time.  Has a hx of asthma denies any cough or SOB symptoms however.        History obtained from : patient  Review of Systems   Constitutional:  Negative for activity change, appetite change, fatigue and fever.   HENT:  Negative for congestion and ear discharge.    Respiratory:  Negative for cough and shortness of breath.    Cardiovascular:  Negative for chest pain and palpitations.   Gastrointestinal:  Negative for diarrhea and nausea.   Musculoskeletal:  Negative for arthralgias and back pain.   Skin:  Negative for color change and rash.   Neurological:  Negative for dizziness and headaches.   Psychiatric/Behavioral:  Positive for  sleep disturbance. Negative for agitation and behavioral problems.      Pertinent Medical History       Medical History Reviewed by provider this encounter:  Tobacco  Allergies  Meds  Problems  Med Hx  Surg Hx  Fam Hx       Past Medical History   Past Medical History:   Diagnosis Date    Abscess of axilla, right 10/29/2021    Asthma     Heart murmur     Hidradenitis suppurativa of right axilla 10/29/2021    Pulmonic stenosis      Past Surgical History:   Procedure Laterality Date    ANGIOPLASTY      KNEE SURGERY       Family History   Problem Relation Age of Onset    Hyperlipidemia Father     Hypertension Father      Current Outpatient Medications on File Prior to Visit   Medication Sig Dispense Refill    meclizine (ANTIVERT) 25 mg tablet Take 1 tablet (25 mg total) by mouth 3 (three) times a day as needed for dizziness 30 tablet 0    olopatadine (PATANOL) 0.1 % ophthalmic solution Administer 1 drop to both eyes 2 (two) times a day 5 mL 1    [DISCONTINUED] albuterol (Ventolin HFA) 90 mcg/act inhaler Inhale 2 puffs 4 (four) times a day 18 g 3    [DISCONTINUED] fluticasone-salmeterol (Advair) 250-50 mcg/dose inhaler Inhale      [DISCONTINUED] Semaglutide-Weight Management (Wegovy) 2.4 MG/0.75ML Inject 0.75 mL (2.4 mg total) under the skin once a week 3 mL 3    [DISCONTINUED] zolpidem (AMBIEN) 5 mg tablet Take 1 tablet (5 mg total) by mouth daily at bedtime as needed for sleep 30 tablet 2    valACYclovir (VALTREX) 1,000 mg tablet Take 1 tablet (1,000 mg total) by mouth 3 (three) times a day for 7 days 21 tablet 3     No current facility-administered medications on file prior to visit.     Allergies   Allergen Reactions    Influenza Vaccines Itching      Current Outpatient Medications on File Prior to Visit   Medication Sig Dispense Refill    meclizine (ANTIVERT) 25 mg tablet Take 1 tablet (25 mg total) by mouth 3 (three) times a day as needed for dizziness 30 tablet 0    olopatadine (PATANOL) 0.1 % ophthalmic  solution Administer 1 drop to both eyes 2 (two) times a day 5 mL 1    [DISCONTINUED] albuterol (Ventolin HFA) 90 mcg/act inhaler Inhale 2 puffs 4 (four) times a day 18 g 3    [DISCONTINUED] fluticasone-salmeterol (Advair) 250-50 mcg/dose inhaler Inhale      [DISCONTINUED] Semaglutide-Weight Management (Wegovy) 2.4 MG/0.75ML Inject 0.75 mL (2.4 mg total) under the skin once a week 3 mL 3    [DISCONTINUED] zolpidem (AMBIEN) 5 mg tablet Take 1 tablet (5 mg total) by mouth daily at bedtime as needed for sleep 30 tablet 2    valACYclovir (VALTREX) 1,000 mg tablet Take 1 tablet (1,000 mg total) by mouth 3 (three) times a day for 7 days 21 tablet 3     No current facility-administered medications on file prior to visit.      Social History     Tobacco Use    Smoking status: Never    Smokeless tobacco: Never   Vaping Use    Vaping status: Never Used   Substance and Sexual Activity    Alcohol use: No    Drug use: No    Sexual activity: Not on file     Comment: Unprotected sex         Objective     /75   Pulse 66   Temp 98.5 °F (36.9 °C)   Ht 6' (1.829 m)   Wt 109 kg (239 lb 8 oz)   SpO2 98%   BMI 32.48 kg/m²     Physical Exam  Constitutional:       General: He is not in acute distress.     Appearance: He is well-developed. He is not diaphoretic.   Eyes:      General: No scleral icterus.     Pupils: Pupils are equal, round, and reactive to light.   Cardiovascular:      Rate and Rhythm: Normal rate and regular rhythm.      Heart sounds: Normal heart sounds. No murmur heard.  Pulmonary:      Effort: Pulmonary effort is normal. No respiratory distress.      Breath sounds: Normal breath sounds. No wheezing.   Abdominal:      General: Bowel sounds are normal. There is no distension.      Palpations: Abdomen is soft.      Tenderness: There is no abdominal tenderness.   Skin:     General: Skin is warm and dry.      Findings: No rash.   Neurological:      Mental Status: He is alert and oriented to person, place, and time.

## 2024-10-16 NOTE — ASSESSMENT & PLAN NOTE
After discussing risks and benefits of medication along with side effects will start the following:   Orders:    Semaglutide-Weight Management (Wegovy) 1.7 MG/0.75ML; Inject 1.7 mg under the skin weekly

## 2024-10-22 DIAGNOSIS — Z72.51 UNPROTECTED SEX: ICD-10-CM

## 2024-10-22 RX ORDER — VALACYCLOVIR HYDROCHLORIDE 1 G/1
1000 TABLET, FILM COATED ORAL 3 TIMES DAILY
Qty: 21 TABLET | Refills: 0 | Status: SHIPPED | OUTPATIENT
Start: 2024-10-22 | End: 2024-10-29

## 2024-10-22 NOTE — TELEPHONE ENCOUNTER
Reason for call:   [x] Refill   [] Prior Auth  [] Other:     Office:   [x] PCP/Provider -  George Singh MD   [] Specialty/Provider -     Medication:     valACYclovir (VALTREX) 1,000 mg tablet        Dose/Frequency:     Take 1 tablet (1,000 mg total) by mouth 3 (three) times a day for 7 days       Quantity: 21    [id number] [address] [address] [phone]     Does the patient have enough for 3 days?   [] Yes   [x] No - Send as HP to POD

## 2024-10-23 ENCOUNTER — TELEPHONE (OUTPATIENT)
Age: 39
End: 2024-10-23

## 2024-10-23 NOTE — TELEPHONE ENCOUNTER
Patients mother called in looking for an update on patients forms she stated she dropped them off yesterday but patients job is asking for them. Please advise.

## 2024-10-29 ENCOUNTER — OFFICE VISIT (OUTPATIENT)
Dept: FAMILY MEDICINE CLINIC | Facility: CLINIC | Age: 39
End: 2024-10-29
Payer: COMMERCIAL

## 2024-10-29 VITALS
OXYGEN SATURATION: 96 % | BODY MASS INDEX: 32.61 KG/M2 | WEIGHT: 240.8 LBS | HEART RATE: 80 BPM | SYSTOLIC BLOOD PRESSURE: 128 MMHG | DIASTOLIC BLOOD PRESSURE: 74 MMHG | TEMPERATURE: 98.3 F | HEIGHT: 72 IN

## 2024-10-29 DIAGNOSIS — L73.9 FOLLICULITIS: Primary | ICD-10-CM

## 2024-10-29 DIAGNOSIS — Z11.3 SCREENING EXAMINATION FOR STI: ICD-10-CM

## 2024-10-29 PROCEDURE — 99213 OFFICE O/P EST LOW 20 MIN: CPT | Performed by: PHYSICIAN ASSISTANT

## 2024-10-29 NOTE — PROGRESS NOTES
Ambulatory Visit  Name: Marlon Salinas      : 1985      MRN: [id number]  Encounter Provider: Arnie Schroeder PA-C  Encounter Date: 10/29/2024   Encounter department: [facility]    Assessment & Plan  Screening examination for STI  Agreeable to sti screening   Orders:    Chlamydia/GC amplified DNA by PCR; Future    HIV 1/2 AG/AB w Reflex [facility] for 2 yr old and above; Future    Hepatitis C antibody; Future    RPR-Syphilis Screening (Total Syphilis IGG/IGM); Future    Folliculitis  Exam consistent with folliculitis which seems to be resolving. Keep area clean with soap/water. Follow up prn            History of Present Illness     Pt presents with concerns of penile lesion on shaft of penis. Mild pustular discharge from the lesion per pt. No other  sx. Took valtrex. No new sex partners      Review of Systems   Constitutional: Negative.    Genitourinary:  Negative for penile discharge, penile pain, penile swelling and scrotal swelling.        Penile lesion shaft of penis     Past Medical History:   Diagnosis Date    Abscess of axilla, right 10/29/2021    Asthma     Heart murmur     Hidradenitis suppurativa of right axilla 10/29/2021    Pulmonic stenosis      Past Surgical History:   Procedure Laterality Date    ANGIOPLASTY      KNEE SURGERY       Family History   Problem Relation Age of Onset    Hyperlipidemia Father     Hypertension Father      Social History     Tobacco Use    Smoking status: Never    Smokeless tobacco: Never   Vaping Use    Vaping status: Never Used   Substance and Sexual Activity    Alcohol use: No    Drug use: No    Sexual activity: Not on file     Comment: Unprotected sex     Current Outpatient Medications on File Prior to Visit   Medication Sig    albuterol (Ventolin HFA) 90 mcg/act inhaler Inhale 2 puffs 4 (four) times a day    Fluticasone-Salmeterol (Advair Diskus) 250-50 mcg/dose inhaler Inhale 1 puff 2 (two) times a day    meclizine (ANTIVERT) 25 mg tablet  Take 1 tablet (25 mg total) by mouth 3 (three) times a day as needed for dizziness    olopatadine (PATANOL) 0.1 % ophthalmic solution Administer 1 drop to both eyes 2 (two) times a day    Semaglutide-Weight Management (Wegovy) 1.7 MG/0.75ML Inject 1.7 mg under the skin weekly    valACYclovir (VALTREX) 1,000 mg tablet Take 1 tablet (1,000 mg total) by mouth 3 (three) times a day for 7 days    zolpidem (AMBIEN) 10 mg tablet Take 1 tablet (10 mg total) by mouth daily at bedtime as needed for sleep     Allergies   Allergen Reactions    Influenza Vaccines Itching     Immunization History   Administered Date(s) Administered    COVID-19 PFIZER VACCINE 0.3 ML IM 04/05/2021, 04/27/2021    INFLUENZA 10/01/2017    Influenza Quadrivalent Preservative Free 3 years and older IM 10/01/2017    Influenza, injectable, quadrivalent, preservative free 0.5 mL 10/29/2021     Objective     /74   Pulse 80   Temp 98.3 °F (36.8 °C)   Ht 6' (1.829 m)   Wt 109 kg (240 lb 12.8 oz)   SpO2 96%   BMI 32.66 kg/m²     Physical Exam  Vitals and nursing note reviewed. Exam conducted with a chaperone present.   Constitutional:       Appearance: Normal appearance.   HENT:      Head: Normocephalic and atraumatic.   Pulmonary:      Effort: Pulmonary effort is normal. No respiratory distress.   Genitourinary:     Penis: Normal.        Skin:     General: Skin is warm and dry.   Neurological:      Mental Status: He is alert and oriented to person, place, and time.

## 2025-01-10 ENCOUNTER — OFFICE VISIT (OUTPATIENT)
Dept: FAMILY MEDICINE CLINIC | Facility: CLINIC | Age: 40
End: 2025-01-10
Payer: COMMERCIAL

## 2025-01-10 VITALS
HEART RATE: 79 BPM | OXYGEN SATURATION: 96 % | BODY MASS INDEX: 32.91 KG/M2 | SYSTOLIC BLOOD PRESSURE: 116 MMHG | TEMPERATURE: 97.3 F | DIASTOLIC BLOOD PRESSURE: 78 MMHG | HEIGHT: 72 IN | WEIGHT: 243 LBS

## 2025-01-10 DIAGNOSIS — J01.00 ACUTE NON-RECURRENT MAXILLARY SINUSITIS: Primary | ICD-10-CM

## 2025-01-10 PROCEDURE — 99213 OFFICE O/P EST LOW 20 MIN: CPT | Performed by: PHYSICIAN ASSISTANT

## 2025-01-10 NOTE — PROGRESS NOTES
"Name: Marlon Salinas      : 1985      MRN: [id number]  Encounter Provider: Arnie Schroeder PA-C  Encounter Date: 1/10/2025   Encounter department: [facility]    Assessment & Plan  Acute non-recurrent maxillary sinusitis  I suspect he developed sinusitis after a viral infection. Begin Augmentin. Tylenol, flonase, mucinex recommended for sx control  Follow up prn  Orders:  •  amoxicillin-clavulanate (AUGMENTIN) 875-125 mg per tablet; Take 1 tablet by mouth every 12 (twelve) hours for 7 days         History of Present Illness     Pt presents with R sided facial pain, HA, sinus pressure, congestion and ear pain. Notes for >1 week he had \"cold symptoms\" of congestion, cough. These resolved but sinus pain/HA/pressure persists. No fevers. Family sick with similar sx      Review of Systems   Constitutional:  Negative for chills, fatigue and fever.   HENT:  Positive for sinus pressure and sinus pain. Negative for congestion, ear pain, hearing loss, nosebleeds, postnasal drip, rhinorrhea, sneezing and sore throat.    Eyes:  Negative for pain, discharge, itching and visual disturbance.   Respiratory:  Negative for cough, chest tightness, shortness of breath and wheezing.    Cardiovascular:  Negative for chest pain, palpitations and leg swelling.   Gastrointestinal:  Negative for abdominal pain, blood in stool, constipation, diarrhea, nausea and vomiting.   Genitourinary:  Negative for frequency and urgency.   Neurological:  Positive for headaches. Negative for dizziness, light-headedness and numbness.     Past Medical History:   Diagnosis Date   • Abscess of axilla, right 10/29/2021   • Asthma    • Heart murmur    • Hidradenitis suppurativa of right axilla 10/29/2021   • Pulmonic stenosis      Past Surgical History:   Procedure Laterality Date   • ANGIOPLASTY     • KNEE SURGERY       Family History   Problem Relation Age of Onset   • Hyperlipidemia Father    • Hypertension Father      Social " History     Tobacco Use   • Smoking status: Never   • Smokeless tobacco: Never   Vaping Use   • Vaping status: Never Used   Substance and Sexual Activity   • Alcohol use: No   • Drug use: No   • Sexual activity: Not on file     Comment: Unprotected sex     Current Outpatient Medications on File Prior to Visit   Medication Sig   • albuterol (Ventolin HFA) 90 mcg/act inhaler Inhale 2 puffs 4 (four) times a day   • Fluticasone-Salmeterol (Advair Diskus) 250-50 mcg/dose inhaler Inhale 1 puff 2 (two) times a day   • meclizine (ANTIVERT) 25 mg tablet Take 1 tablet (25 mg total) by mouth 3 (three) times a day as needed for dizziness   • olopatadine (PATANOL) 0.1 % ophthalmic solution Administer 1 drop to both eyes 2 (two) times a day   • Semaglutide-Weight Management (Wegovy) 1.7 MG/0.75ML Inject 1.7 mg under the skin weekly   • valACYclovir (VALTREX) 1,000 mg tablet Take 1 tablet (1,000 mg total) by mouth 3 (three) times a day for 7 days   • zolpidem (AMBIEN) 10 mg tablet Take 1 tablet (10 mg total) by mouth daily at bedtime as needed for sleep     Allergies   Allergen Reactions   • Influenza Vaccines Itching     Immunization History   Administered Date(s) Administered   • COVID-19 PFIZER VACCINE 0.3 ML IM 04/05/2021, 04/27/2021   • INFLUENZA 10/01/2017   • Influenza Quadrivalent Preservative Free 3 years and older IM 10/01/2017   • Influenza, injectable, quadrivalent, preservative free 0.5 mL 10/29/2021     Objective   /78   Pulse 79   Temp (!) 97.3 °F (36.3 °C)   Ht 6' (1.829 m)   Wt 110 kg (243 lb)   SpO2 96%   BMI 32.96 kg/m²     Physical Exam  Vitals and nursing note reviewed.   Constitutional:       General: He is not in acute distress.     Appearance: He is well-developed.   HENT:      Head: Normocephalic and atraumatic.      Right Ear: Tympanic membrane normal.      Left Ear: Tympanic membrane normal.      Nose:      Right Sinus: Maxillary sinus tenderness and frontal sinus tenderness present.       Left Sinus: No maxillary sinus tenderness or frontal sinus tenderness.      Mouth/Throat:      Mouth: Mucous membranes are moist.      Pharynx: Oropharynx is clear.   Eyes:      Conjunctiva/sclera: Conjunctivae normal.   Cardiovascular:      Rate and Rhythm: Normal rate and regular rhythm.      Heart sounds: No murmur heard.  Pulmonary:      Effort: Pulmonary effort is normal. No respiratory distress.      Breath sounds: Normal breath sounds.   Musculoskeletal:         General: No swelling.      Cervical back: Neck supple.   Skin:     General: Skin is warm and dry.      Capillary Refill: Capillary refill takes less than 2 seconds.   Neurological:      Mental Status: He is alert.

## 2025-01-15 ENCOUNTER — TELEPHONE (OUTPATIENT)
Age: 40
End: 2025-01-15

## 2025-01-15 NOTE — TELEPHONE ENCOUNTER
Mother called stating that her son is still having symptoms of sinus infection that he had been seen in office for on 1/10. She is requesting a different antibiotic for him or different medication that can help. He is having a lot of sinus pressure and it is causing him to have a headache. She doug fever, blurred vision or any neuro defects. Just Headache. He was prescribed Augmentin originally. Please review and advise.

## 2025-01-16 DIAGNOSIS — J01.00 ACUTE NON-RECURRENT MAXILLARY SINUSITIS: Primary | ICD-10-CM

## 2025-01-16 RX ORDER — DOXYCYCLINE 100 MG/1
100 TABLET ORAL 2 TIMES DAILY
Qty: 14 TABLET | Refills: 0 | Status: SHIPPED | OUTPATIENT
Start: 2025-01-16 | End: 2025-01-23

## 2025-01-16 NOTE — TELEPHONE ENCOUNTER
Patients mom called to follow up. Patient is still having very bad headaches.      Tatiana Salinas (Mother)  [phone] (Mobile)

## 2025-01-16 NOTE — TELEPHONE ENCOUNTER
Recommend to stop augmentin, doxycyline sent. Also claritin D sent to his pharmacy to take once in the am only for 5 days.

## 2025-02-13 DIAGNOSIS — F51.01 PRIMARY INSOMNIA: ICD-10-CM

## 2025-02-13 RX ORDER — ZOLPIDEM TARTRATE 10 MG/1
10 TABLET ORAL
Qty: 30 TABLET | Refills: 0 | Status: SHIPPED | OUTPATIENT
Start: 2025-02-13

## 2025-02-17 DIAGNOSIS — J01.00 ACUTE NON-RECURRENT MAXILLARY SINUSITIS: ICD-10-CM

## 2025-03-19 DIAGNOSIS — F51.01 PRIMARY INSOMNIA: ICD-10-CM

## 2025-03-20 RX ORDER — ZOLPIDEM TARTRATE 10 MG/1
10 TABLET ORAL
Qty: 30 TABLET | Refills: 1 | Status: SHIPPED | OUTPATIENT
Start: 2025-03-20

## 2025-04-08 ENCOUNTER — TELEPHONE (OUTPATIENT)
Dept: FAMILY MEDICINE CLINIC | Facility: CLINIC | Age: 40
End: 2025-04-08

## 2025-04-08 DIAGNOSIS — Z72.51 UNPROTECTED SEX: ICD-10-CM

## 2025-04-08 DIAGNOSIS — F51.01 PRIMARY INSOMNIA: ICD-10-CM

## 2025-04-08 RX ORDER — VALACYCLOVIR HYDROCHLORIDE 1 G/1
1000 TABLET, FILM COATED ORAL 3 TIMES DAILY
Qty: 21 TABLET | Refills: 0 | Status: SHIPPED | OUTPATIENT
Start: 2025-04-08 | End: 2025-04-15

## 2025-04-08 RX ORDER — SEMAGLUTIDE 2.4 MG/.75ML
2.4 INJECTION, SOLUTION SUBCUTANEOUS WEEKLY
Qty: 3 ML | Refills: 3 | Status: SHIPPED | OUTPATIENT
Start: 2025-04-08

## 2025-04-08 NOTE — TELEPHONE ENCOUNTER
Patient is requesting the next dose  How are you tolerating the medication?   [] Nausea  [] Vomiting  [] Diarrhea  [x] Asymptomatic  [] Other:    Last visit weight: 243 lbs     Current weight: unknown     Date of last injection: 04/01/25-due today    How many injections do you have left: 1    Current dose Semaglutide-Weight Management (Wegovy) 1.7 MG  [id number] - [place] - [place]

## 2025-04-08 NOTE — TELEPHONE ENCOUNTER
Reason for call:   [x] Refill   [] Prior Auth  [] Other:     Office:   [x] PCP/Provider - [facility]   Authorized By: George Singh MD    [] Specialty/Provider -     Medication: valACYclovir (VALTREX) 1,000 mg tablet    Dose/Frequency: Take 1 tablet (1,000 mg total) by mouth 3 (three) times a day for 7 days,    Quantity: 21 tablet    Pharmacy: [id number] - [place] - [address] [place] [address]   Does the patient have enough for 3 days?   [] Yes   [x] No - Send as HP to POD    Mail Away Pharmacy   Does the patient have enough for 10 days?   [] Yes   [] No - Send as HP to POD

## 2025-04-09 ENCOUNTER — TELEPHONE (OUTPATIENT)
Age: 40
End: 2025-04-09

## 2025-04-09 NOTE — TELEPHONE ENCOUNTER
Prior Authorization requested for Wegovy 2.4 mg . Your patient is due for a follow up visit for medication management and weight check. Patient's last office visit was 10/16/2024 where weight was discussed. Once visit is completed please send message back to the prior authorization POD so a prior authorization can be submitted. Thank you

## 2025-04-22 ENCOUNTER — OFFICE VISIT (OUTPATIENT)
Dept: FAMILY MEDICINE CLINIC | Facility: CLINIC | Age: 40
End: 2025-04-22
Payer: COMMERCIAL

## 2025-04-22 VITALS
OXYGEN SATURATION: 95 % | WEIGHT: 240 LBS | BODY MASS INDEX: 32.51 KG/M2 | TEMPERATURE: 97.9 F | DIASTOLIC BLOOD PRESSURE: 80 MMHG | SYSTOLIC BLOOD PRESSURE: 122 MMHG | HEIGHT: 72 IN | HEART RATE: 85 BPM

## 2025-04-22 DIAGNOSIS — Z13.220 SCREENING FOR LIPID DISORDERS: ICD-10-CM

## 2025-04-22 DIAGNOSIS — Z11.3 SCREENING EXAMINATION FOR STI: ICD-10-CM

## 2025-04-22 DIAGNOSIS — Z80.42 FAMILY HISTORY OF PROSTATE CANCER: ICD-10-CM

## 2025-04-22 DIAGNOSIS — E66.811 CLASS 1 OBESITY DUE TO EXCESS CALORIES WITHOUT SERIOUS COMORBIDITY WITH BODY MASS INDEX (BMI) OF 32.0 TO 32.9 IN ADULT: ICD-10-CM

## 2025-04-22 DIAGNOSIS — Z00.00 HEALTH MAINTENANCE EXAMINATION: Primary | ICD-10-CM

## 2025-04-22 DIAGNOSIS — Z13.1 SCREENING FOR DIABETES MELLITUS: ICD-10-CM

## 2025-04-22 DIAGNOSIS — F51.01 PRIMARY INSOMNIA: ICD-10-CM

## 2025-04-22 DIAGNOSIS — E66.09 CLASS 1 OBESITY DUE TO EXCESS CALORIES WITHOUT SERIOUS COMORBIDITY WITH BODY MASS INDEX (BMI) OF 32.0 TO 32.9 IN ADULT: ICD-10-CM

## 2025-04-22 PROCEDURE — 99396 PREV VISIT EST AGE 40-64: CPT | Performed by: PHYSICIAN ASSISTANT

## 2025-04-22 RX ORDER — ZOLPIDEM TARTRATE 10 MG/1
10 TABLET ORAL
Qty: 30 TABLET | Refills: 1 | Status: SHIPPED | OUTPATIENT
Start: 2025-04-22

## 2025-04-22 NOTE — PROGRESS NOTES
Adult Annual Physical  Name: Marlon Salinas      : 1985      MRN: [id number]  Encounter Provider: Arnie Schroeder PA-C  Encounter Date: 2025   Encounter department: [facility]    :  Assessment & Plan  Health maintenance examination  Hx reviewed and updated. Unremarkable exam. Screenings/guidance as below.        Screening examination for STI    Orders:  •  HIV 1/2 AG/AB w Reflex [facility] for 2 yr old and above; Future  •  Hepatitis C antibody; Future  •  Chlamydia/GC amplified DNA by PCR; Future  •  RPR-Syphilis Screening (Total Syphilis IGG/IGM); Future    Screening for diabetes mellitus    Orders:  •  Hemoglobin A1C; Future    Screening for lipid disorders    Orders:  •  Lipid Panel with Direct LDL reflex; Future    Family history of prostate cancer    Orders:  •  PSA, Total Screen; Future    Class 1 obesity due to excess calories without serious comorbidity with body mass index (BMI) of 32.0 to 32.9 in adult    BMI Counseling: Body mass index is 32.55 kg/m². The BMI is above normal. Nutrition recommendations include reducing portion sizes, decreasing overall calorie intake, moderation in carbohydrate intake, increasing intake of lean protein, reducing intake of saturated fat and trans fat, and reducing intake of cholesterol. Exercise recommendations include moderate aerobic physical activity for 150 minutes/week. Pharmacotherapy was ordered for patient to aid in weight loss.    Orders:  •  Comprehensive metabolic panel; Future  •  CBC and differential; Future        Preventive Screenings:  - Diabetes Screening: orders placed  - Cholesterol Screening: orders placed   - Hepatitis C screening: orders placed   - HIV screening: orders placed   - Colon cancer screening: screening not indicated   - Lung cancer screening: screening not indicated   - Prostate cancer screening: orders placed     Counseling/Anticipatory Guidance:  - Alcohol: discussed moderation in alcohol intake and  recommendations for healthy alcohol use.   - Drug use: discussed harms of illicit drug use and how it can negatively impact mental/physical health.   - Tobacco use: discussed harms of tobacco use and management options for quitting.   - Dental health: discussed importance of regular tooth brushing, flossing, and dental visits.   - Sexual health: discussed sexually transmitted diseases, partner selection, use of condoms, avoidance of unintended pregnancy, and contraceptive alternatives.   - Diet: discussed recommendations for a healthy/well-balanced diet.   - Exercise: the importance of regular exercise/physical activity was discussed. Recommend exercise 3-5 times per week for at least 30 minutes.          History of Present Illness     Adult Annual Physical:  Patient presents for annual physical. Annual physical  Remains on wegovy 2.4mg, current BMI 32.5  Interval hx reviewed  FH reviewed, +prostate cancer in father, paternal grandfather  Non smoker  Uses marijuana  No abuse/misuse of alcohol or other drugs  Meds/allergies reviewed .     Diet and Physical Activity:  - Diet/Nutrition: well balanced diet, consuming 3-5 servings of fruits/vegetables daily, adequate whole grain intake and adequate fiber intake. High Protein  - Exercise: moderate cardiovascular exercise, 5-7 times a week on average and 30-60 minutes on average.    Depression Screening:  - PHQ-2 Score: 0    General Health:  - Sleep: sleeps poorly and 4-6 hours of sleep on average.  - Hearing: normal hearing bilateral ears.  - Vision: no vision problems and most recent eye exam < 1 year ago.  - Dental: regular dental visits, brushes teeth twice daily and floss regularly.     Health:  - History of STDs: yes.   - Urinary symptoms: weak urinary stream.     Review of Systems   Constitutional:  Negative for chills, fatigue and fever.   HENT:  Negative for congestion, ear pain, hearing loss, nosebleeds, postnasal drip, rhinorrhea, sinus pressure, sinus pain,  sneezing and sore throat.    Eyes:  Negative for pain, discharge, itching and visual disturbance.   Respiratory:  Negative for cough, chest tightness, shortness of breath and wheezing.    Cardiovascular:  Negative for chest pain, palpitations and leg swelling.   Gastrointestinal:  Negative for abdominal pain, blood in stool, constipation, diarrhea, nausea and vomiting.   Genitourinary:  Negative for frequency and urgency.   Neurological:  Negative for dizziness, light-headedness and numbness.         Objective   /80   Pulse 85   Temp 97.9 °F (36.6 °C)   Ht 6' (1.829 m)   Wt 109 kg (240 lb)   SpO2 95%   BMI 32.55 kg/m²     Physical Exam  Vitals and nursing note reviewed.   Constitutional:       General: He is not in acute distress.     Appearance: He is well-developed.   HENT:      Head: Normocephalic and atraumatic.      Right Ear: Tympanic membrane normal.      Left Ear: Tympanic membrane normal.   Eyes:      Conjunctiva/sclera: Conjunctivae normal.   Cardiovascular:      Rate and Rhythm: Normal rate and regular rhythm.      Heart sounds: No murmur heard.  Pulmonary:      Effort: Pulmonary effort is normal. No respiratory distress.      Breath sounds: Normal breath sounds. No wheezing, rhonchi or rales.   Abdominal:      Palpations: Abdomen is soft.      Tenderness: There is no abdominal tenderness.   Musculoskeletal:         General: No swelling.      Cervical back: Neck supple.   Skin:     General: Skin is warm and dry.      Capillary Refill: Capillary refill takes less than 2 seconds.   Neurological:      Mental Status: He is alert.   Psychiatric:         Mood and Affect: Mood normal.

## 2025-04-29 ENCOUNTER — APPOINTMENT (OUTPATIENT)
Dept: LAB | Facility: CLINIC | Age: 40
End: 2025-04-29
Payer: COMMERCIAL

## 2025-04-29 DIAGNOSIS — Z13.220 SCREENING FOR LIPID DISORDERS: ICD-10-CM

## 2025-04-29 DIAGNOSIS — Z11.3 SCREENING EXAMINATION FOR STI: ICD-10-CM

## 2025-04-29 DIAGNOSIS — E66.09 CLASS 1 OBESITY DUE TO EXCESS CALORIES WITHOUT SERIOUS COMORBIDITY WITH BODY MASS INDEX (BMI) OF 32.0 TO 32.9 IN ADULT: ICD-10-CM

## 2025-04-29 DIAGNOSIS — E66.811 CLASS 1 OBESITY DUE TO EXCESS CALORIES WITHOUT SERIOUS COMORBIDITY WITH BODY MASS INDEX (BMI) OF 32.0 TO 32.9 IN ADULT: ICD-10-CM

## 2025-04-29 DIAGNOSIS — Z13.1 SCREENING FOR DIABETES MELLITUS: ICD-10-CM

## 2025-04-29 DIAGNOSIS — Z80.42 FAMILY HISTORY OF PROSTATE CANCER: ICD-10-CM

## 2025-04-29 LAB
ALBUMIN SERPL BCG-MCNC: 4.1 G/DL (ref 3.5–5)
ALP SERPL-CCNC: 58 U/L (ref 34–104)
ALT SERPL W P-5'-P-CCNC: 22 U/L (ref 7–52)
ANION GAP SERPL CALCULATED.3IONS-SCNC: 8 MMOL/L (ref 4–13)
AST SERPL W P-5'-P-CCNC: 15 U/L (ref 13–39)
BASOPHILS # BLD AUTO: 0.11 THOUSANDS/ÂΜL (ref 0–0.1)
BASOPHILS NFR BLD AUTO: 1 % (ref 0–1)
BILIRUB SERPL-MCNC: 0.3 MG/DL (ref 0.2–1)
BUN SERPL-MCNC: 15 MG/DL (ref 5–25)
CALCIUM SERPL-MCNC: 9.4 MG/DL (ref 8.4–10.2)
CHLORIDE SERPL-SCNC: 108 MMOL/L (ref 96–108)
CHOLEST SERPL-MCNC: 219 MG/DL (ref ?–200)
CO2 SERPL-SCNC: 27 MMOL/L (ref 21–32)
CREAT SERPL-MCNC: 0.92 MG/DL (ref 0.6–1.3)
EOSINOPHIL # BLD AUTO: 0.31 THOUSAND/ÂΜL (ref 0–0.61)
EOSINOPHIL NFR BLD AUTO: 3 % (ref 0–6)
ERYTHROCYTE [DISTWIDTH] IN BLOOD BY AUTOMATED COUNT: 13 % (ref 11.6–15.1)
EST. AVERAGE GLUCOSE BLD GHB EST-MCNC: 103 MG/DL
GFR SERPL CREATININE-BSD FRML MDRD: 103 ML/MIN/1.73SQ M
GLUCOSE P FAST SERPL-MCNC: 84 MG/DL (ref 65–99)
HBA1C MFR BLD: 5.2 %
HCT VFR BLD AUTO: 39.6 % (ref 36.5–49.3)
HDLC SERPL-MCNC: 48 MG/DL
HGB BLD-MCNC: 13.3 G/DL (ref 12–17)
IMM GRANULOCYTES # BLD AUTO: 0.08 THOUSAND/UL (ref 0–0.2)
IMM GRANULOCYTES NFR BLD AUTO: 1 % (ref 0–2)
LDLC SERPL CALC-MCNC: 144 MG/DL (ref 0–100)
LYMPHOCYTES # BLD AUTO: 3.71 THOUSANDS/ÂΜL (ref 0.6–4.47)
LYMPHOCYTES NFR BLD AUTO: 35 % (ref 14–44)
MCH RBC QN AUTO: 29.1 PG (ref 26.8–34.3)
MCHC RBC AUTO-ENTMCNC: 33.6 G/DL (ref 31.4–37.4)
MCV RBC AUTO: 87 FL (ref 82–98)
MONOCYTES # BLD AUTO: 0.63 THOUSAND/ÂΜL (ref 0.17–1.22)
MONOCYTES NFR BLD AUTO: 6 % (ref 4–12)
NEUTROPHILS # BLD AUTO: 5.75 THOUSANDS/ÂΜL (ref 1.85–7.62)
NEUTS SEG NFR BLD AUTO: 54 % (ref 43–75)
NRBC BLD AUTO-RTO: 0 /100 WBCS
PLATELET # BLD AUTO: 233 THOUSANDS/UL (ref 149–390)
PMV BLD AUTO: 10.8 FL (ref 8.9–12.7)
POTASSIUM SERPL-SCNC: 4.2 MMOL/L (ref 3.5–5.3)
PROT SERPL-MCNC: 6.7 G/DL (ref 6.4–8.4)
PSA SERPL-MCNC: 0.65 NG/ML (ref 0–4)
RBC # BLD AUTO: 4.57 MILLION/UL (ref 3.88–5.62)
SODIUM SERPL-SCNC: 143 MMOL/L (ref 135–147)
TRIGL SERPL-MCNC: 135 MG/DL (ref ?–150)
WBC # BLD AUTO: 10.59 THOUSAND/UL (ref 4.31–10.16)

## 2025-04-29 PROCEDURE — 86780 TREPONEMA PALLIDUM: CPT

## 2025-04-29 PROCEDURE — 87591 N.GONORRHOEAE DNA AMP PROB: CPT

## 2025-04-29 PROCEDURE — 80053 COMPREHEN METABOLIC PANEL: CPT

## 2025-04-29 PROCEDURE — 87389 HIV-1 AG W/HIV-1&-2 AB AG IA: CPT

## 2025-04-29 PROCEDURE — 36415 COLL VENOUS BLD VENIPUNCTURE: CPT

## 2025-04-29 PROCEDURE — 87491 CHLMYD TRACH DNA AMP PROBE: CPT

## 2025-04-29 PROCEDURE — G0103 PSA SCREENING: HCPCS

## 2025-04-29 PROCEDURE — 80061 LIPID PANEL: CPT

## 2025-04-29 PROCEDURE — 86803 HEPATITIS C AB TEST: CPT

## 2025-04-29 PROCEDURE — 83036 HEMOGLOBIN GLYCOSYLATED A1C: CPT

## 2025-04-29 PROCEDURE — 85025 COMPLETE CBC W/AUTO DIFF WBC: CPT

## 2025-04-30 ENCOUNTER — RESULTS FOLLOW-UP (OUTPATIENT)
Dept: FAMILY MEDICINE CLINIC | Facility: CLINIC | Age: 40
End: 2025-04-30

## 2025-04-30 LAB
C TRACH DNA SPEC QL NAA+PROBE: NEGATIVE
HCV AB SER QL: NORMAL
HIV 1+2 AB+HIV1 P24 AG SERPL QL IA: NORMAL
N GONORRHOEA DNA SPEC QL NAA+PROBE: NEGATIVE
TREPONEMA PALLIDUM IGG+IGM AB [PRESENCE] IN SERUM OR PLASMA BY IMMUNOASSAY: NORMAL

## 2025-05-22 ENCOUNTER — TELEPHONE (OUTPATIENT)
Dept: FAMILY MEDICINE CLINIC | Facility: CLINIC | Age: 40
End: 2025-05-22

## 2025-05-22 NOTE — TELEPHONE ENCOUNTER
PA for Wegovy) 2.4 MG/0.75ML SUBMITTED to     via    []CMM-KEY:   [x]Surescripts-Case ID # [id number]   []Availity-Auth ID # NDC #   []Faxed to plan   []Other website   []Phone call Case ID #     [x]PA sent as URGENT    All office notes, labs and other pertaining documents and studies sent. Clinical questions answered. Awaiting determination from insurance company.     Turnaround time for your insurance to make a decision on your Prior Authorization can take 7-21 business days.

## 2025-05-23 NOTE — TELEPHONE ENCOUNTER
PA for Wegovy 2.4 MG/0.75ML  APPROVED     Date(s) approved             Patient advised by          [x]MyChart Message  [x]Phone call   []LMOM  []L/M to call office as no active Communication consent on file  []Unable to leave detailed message as VM not approved on Communication consent       Pharmacy advised by    [x]Fax  []Phone call  []Secure Chat    Approval letter scanned into Media Yes

## 2025-07-29 DIAGNOSIS — F51.01 PRIMARY INSOMNIA: ICD-10-CM

## 2025-07-29 DIAGNOSIS — Z72.51 UNPROTECTED SEX: ICD-10-CM

## 2025-07-30 RX ORDER — VALACYCLOVIR HYDROCHLORIDE 1 G/1
1000 TABLET, FILM COATED ORAL 3 TIMES DAILY
Qty: 21 TABLET | Refills: 0 | Status: SHIPPED | OUTPATIENT
Start: 2025-07-30 | End: 2025-08-06

## 2025-07-30 RX ORDER — ZOLPIDEM TARTRATE 10 MG/1
TABLET ORAL
Qty: 30 TABLET | Refills: 0 | Status: SHIPPED | OUTPATIENT
Start: 2025-07-30